# Patient Record
Sex: FEMALE | Race: WHITE | NOT HISPANIC OR LATINO | Employment: OTHER | URBAN - METROPOLITAN AREA
[De-identification: names, ages, dates, MRNs, and addresses within clinical notes are randomized per-mention and may not be internally consistent; named-entity substitution may affect disease eponyms.]

---

## 2017-07-07 ENCOUNTER — APPOINTMENT (OUTPATIENT)
Dept: CT IMAGING | Facility: HOSPITAL | Age: 57
End: 2017-07-07

## 2017-07-07 ENCOUNTER — APPOINTMENT (OUTPATIENT)
Dept: GENERAL RADIOLOGY | Facility: HOSPITAL | Age: 57
End: 2017-07-07

## 2017-07-07 ENCOUNTER — HOSPITAL ENCOUNTER (INPATIENT)
Facility: HOSPITAL | Age: 57
LOS: 3 days | Discharge: HOME | End: 2017-07-10
Attending: EMERGENCY MEDICINE | Admitting: FAMILY MEDICINE

## 2017-07-07 DIAGNOSIS — N28.9 RENAL LESION: ICD-10-CM

## 2017-07-07 DIAGNOSIS — R50.9 ACUTE FEBRILE ILLNESS: Primary | ICD-10-CM

## 2017-07-07 LAB
ALBUMIN SERPL-MCNC: 3.9 G/DL (ref 3.2–4.8)
ALBUMIN/GLOB SERPL: 1 G/DL (ref 1.5–2.5)
ALP SERPL-CCNC: 94 U/L (ref 25–100)
ALT SERPL W P-5'-P-CCNC: 25 U/L (ref 7–40)
ANION GAP SERPL CALCULATED.3IONS-SCNC: 10 MMOL/L (ref 3–11)
AST SERPL-CCNC: 20 U/L (ref 0–33)
BACTERIA UR QL AUTO: ABNORMAL /HPF
BASOPHILS # BLD AUTO: 0.02 10*3/MM3 (ref 0–0.2)
BASOPHILS NFR BLD AUTO: 0.2 % (ref 0–1)
BILIRUB SERPL-MCNC: 0.4 MG/DL (ref 0.3–1.2)
BILIRUB UR QL STRIP: NEGATIVE
BUN BLD-MCNC: 13 MG/DL (ref 9–23)
BUN/CREAT SERPL: 16.3 (ref 7–25)
CALCIUM SPEC-SCNC: 9.3 MG/DL (ref 8.7–10.4)
CHLORIDE SERPL-SCNC: 91 MMOL/L (ref 99–109)
CLARITY UR: CLEAR
CO2 SERPL-SCNC: 21 MMOL/L (ref 20–31)
COLOR UR: YELLOW
CREAT BLD-MCNC: 0.8 MG/DL (ref 0.6–1.3)
D-LACTATE SERPL-SCNC: 1.2 MMOL/L (ref 0.5–2)
DEPRECATED RDW RBC AUTO: 42.5 FL (ref 37–54)
EOSINOPHIL # BLD AUTO: 0.01 10*3/MM3 (ref 0–0.3)
EOSINOPHIL NFR BLD AUTO: 0.1 % (ref 0–3)
ERYTHROCYTE [DISTWIDTH] IN BLOOD BY AUTOMATED COUNT: 13.8 % (ref 11.3–14.5)
GFR SERPL CREATININE-BSD FRML MDRD: 74 ML/MIN/1.73
GLOBULIN UR ELPH-MCNC: 3.9 GM/DL
GLUCOSE BLD-MCNC: 81 MG/DL (ref 70–100)
GLUCOSE UR STRIP-MCNC: NEGATIVE MG/DL
HCT VFR BLD AUTO: 32 % (ref 34.5–44)
HGB BLD-MCNC: 11 G/DL (ref 11.5–15.5)
HGB UR QL STRIP.AUTO: ABNORMAL
HOLD SPECIMEN: NORMAL
HOLD SPECIMEN: NORMAL
HYALINE CASTS UR QL AUTO: ABNORMAL /LPF
IMM GRANULOCYTES # BLD: 0.06 10*3/MM3 (ref 0–0.03)
IMM GRANULOCYTES NFR BLD: 0.5 % (ref 0–0.6)
KETONES UR QL STRIP: ABNORMAL
LEUKOCYTE ESTERASE UR QL STRIP.AUTO: ABNORMAL
LIPASE SERPL-CCNC: 26 U/L (ref 6–51)
LYMPHOCYTES # BLD AUTO: 1.25 10*3/MM3 (ref 0.6–4.8)
LYMPHOCYTES NFR BLD AUTO: 9.5 % (ref 24–44)
MCH RBC QN AUTO: 29.3 PG (ref 27–31)
MCHC RBC AUTO-ENTMCNC: 34.4 G/DL (ref 32–36)
MCV RBC AUTO: 85.1 FL (ref 80–99)
MONOCYTES # BLD AUTO: 1.5 10*3/MM3 (ref 0–1)
MONOCYTES NFR BLD AUTO: 11.3 % (ref 0–12)
NEUTROPHILS # BLD AUTO: 10.38 10*3/MM3 (ref 1.5–8.3)
NEUTROPHILS NFR BLD AUTO: 78.4 % (ref 41–71)
NITRITE UR QL STRIP: NEGATIVE
PH UR STRIP.AUTO: 6.5 [PH] (ref 5–8)
PLATELET # BLD AUTO: 286 10*3/MM3 (ref 150–450)
PMV BLD AUTO: 8.9 FL (ref 6–12)
POTASSIUM BLD-SCNC: 3.7 MMOL/L (ref 3.5–5.5)
PROCALCITONIN SERPL-MCNC: 74.37 NG/ML
PROT SERPL-MCNC: 7.8 G/DL (ref 5.7–8.2)
PROT UR QL STRIP: ABNORMAL
RBC # BLD AUTO: 3.76 10*6/MM3 (ref 3.89–5.14)
RBC # UR: ABNORMAL /HPF
REF LAB TEST METHOD: ABNORMAL
SODIUM BLD-SCNC: 122 MMOL/L (ref 132–146)
SP GR UR STRIP: <=1.005 (ref 1–1.03)
SQUAMOUS #/AREA URNS HPF: ABNORMAL /HPF
UROBILINOGEN UR QL STRIP: ABNORMAL
WBC NRBC COR # BLD: 13.22 10*3/MM3 (ref 3.5–10.8)
WBC UR QL AUTO: ABNORMAL /HPF
WHOLE BLOOD HOLD SPECIMEN: NORMAL
WHOLE BLOOD HOLD SPECIMEN: NORMAL

## 2017-07-07 PROCEDURE — 0 IOPAMIDOL 61 % SOLUTION: Performed by: EMERGENCY MEDICINE

## 2017-07-07 PROCEDURE — 85025 COMPLETE CBC W/AUTO DIFF WBC: CPT | Performed by: EMERGENCY MEDICINE

## 2017-07-07 PROCEDURE — 87077 CULTURE AEROBIC IDENTIFY: CPT | Performed by: EMERGENCY MEDICINE

## 2017-07-07 PROCEDURE — 99285 EMERGENCY DEPT VISIT HI MDM: CPT

## 2017-07-07 PROCEDURE — 87040 BLOOD CULTURE FOR BACTERIA: CPT | Performed by: EMERGENCY MEDICINE

## 2017-07-07 PROCEDURE — 87186 SC STD MICRODIL/AGAR DIL: CPT | Performed by: EMERGENCY MEDICINE

## 2017-07-07 PROCEDURE — 87086 URINE CULTURE/COLONY COUNT: CPT | Performed by: EMERGENCY MEDICINE

## 2017-07-07 PROCEDURE — 25010000002 PIPERACILLIN-TAZOBACTAM: Performed by: EMERGENCY MEDICINE

## 2017-07-07 PROCEDURE — 83605 ASSAY OF LACTIC ACID: CPT | Performed by: EMERGENCY MEDICINE

## 2017-07-07 PROCEDURE — 25010000002 ONDANSETRON PER 1 MG: Performed by: EMERGENCY MEDICINE

## 2017-07-07 PROCEDURE — 83690 ASSAY OF LIPASE: CPT | Performed by: EMERGENCY MEDICINE

## 2017-07-07 PROCEDURE — 71010 HC CHEST PA OR AP: CPT

## 2017-07-07 PROCEDURE — 81001 URINALYSIS AUTO W/SCOPE: CPT | Performed by: EMERGENCY MEDICINE

## 2017-07-07 PROCEDURE — 84145 PROCALCITONIN (PCT): CPT | Performed by: EMERGENCY MEDICINE

## 2017-07-07 PROCEDURE — 25010000002 VANCOMYCIN PER 500 MG: Performed by: EMERGENCY MEDICINE

## 2017-07-07 PROCEDURE — 80053 COMPREHEN METABOLIC PANEL: CPT | Performed by: EMERGENCY MEDICINE

## 2017-07-07 PROCEDURE — 74177 CT ABD & PELVIS W/CONTRAST: CPT

## 2017-07-07 PROCEDURE — 99222 1ST HOSP IP/OBS MODERATE 55: CPT | Performed by: FAMILY MEDICINE

## 2017-07-07 RX ORDER — FLUOXETINE HYDROCHLORIDE 20 MG/1
20 CAPSULE ORAL DAILY
COMMUNITY

## 2017-07-07 RX ORDER — ACETAMINOPHEN 500 MG
1000 TABLET ORAL ONCE
Status: COMPLETED | OUTPATIENT
Start: 2017-07-07 | End: 2017-07-07

## 2017-07-07 RX ORDER — DICYCLOMINE HYDROCHLORIDE 10 MG/1
10 CAPSULE ORAL
Status: DISCONTINUED | OUTPATIENT
Start: 2017-07-08 | End: 2017-07-10 | Stop reason: HOSPADM

## 2017-07-07 RX ORDER — FLUOXETINE HYDROCHLORIDE 20 MG/1
20 CAPSULE ORAL DAILY
Status: DISCONTINUED | OUTPATIENT
Start: 2017-07-08 | End: 2017-07-10 | Stop reason: HOSPADM

## 2017-07-07 RX ORDER — MELOXICAM 15 MG/1
15 TABLET ORAL DAILY
COMMUNITY

## 2017-07-07 RX ORDER — DICYCLOMINE HYDROCHLORIDE 10 MG/1
10 CAPSULE ORAL
COMMUNITY

## 2017-07-07 RX ORDER — MELOXICAM 7.5 MG/1
15 TABLET ORAL DAILY
Status: DISCONTINUED | OUTPATIENT
Start: 2017-07-08 | End: 2017-07-10 | Stop reason: HOSPADM

## 2017-07-07 RX ORDER — ALBUTEROL SULFATE 90 UG/1
2 AEROSOL, METERED RESPIRATORY (INHALATION) EVERY 4 HOURS PRN
COMMUNITY

## 2017-07-07 RX ORDER — METOPROLOL TARTRATE 50 MG/1
50 TABLET, FILM COATED ORAL 2 TIMES DAILY
COMMUNITY

## 2017-07-07 RX ORDER — BUDESONIDE AND FORMOTEROL FUMARATE DIHYDRATE 80; 4.5 UG/1; UG/1
2 AEROSOL RESPIRATORY (INHALATION)
COMMUNITY

## 2017-07-07 RX ORDER — SODIUM CHLORIDE 450 MG/100ML
75 INJECTION, SOLUTION INTRAVENOUS CONTINUOUS
Status: DISCONTINUED | OUTPATIENT
Start: 2017-07-07 | End: 2017-07-08

## 2017-07-07 RX ORDER — SODIUM CHLORIDE 0.9 % (FLUSH) 0.9 %
10 SYRINGE (ML) INJECTION AS NEEDED
Status: DISCONTINUED | OUTPATIENT
Start: 2017-07-07 | End: 2017-07-10 | Stop reason: HOSPADM

## 2017-07-07 RX ORDER — METOPROLOL TARTRATE 50 MG/1
50 TABLET, FILM COATED ORAL EVERY 12 HOURS SCHEDULED
Status: DISCONTINUED | OUTPATIENT
Start: 2017-07-08 | End: 2017-07-10 | Stop reason: HOSPADM

## 2017-07-07 RX ORDER — FOLIC ACID 1 MG/1
1 TABLET ORAL DAILY
COMMUNITY

## 2017-07-07 RX ORDER — BUDESONIDE AND FORMOTEROL FUMARATE DIHYDRATE 80; 4.5 UG/1; UG/1
2 AEROSOL RESPIRATORY (INHALATION)
Status: DISCONTINUED | OUTPATIENT
Start: 2017-07-08 | End: 2017-07-10 | Stop reason: HOSPADM

## 2017-07-07 RX ORDER — FOLIC ACID 1 MG/1
1 TABLET ORAL DAILY
Status: DISCONTINUED | OUTPATIENT
Start: 2017-07-08 | End: 2017-07-10 | Stop reason: HOSPADM

## 2017-07-07 RX ORDER — ONDANSETRON 2 MG/ML
4 INJECTION INTRAMUSCULAR; INTRAVENOUS ONCE
Status: COMPLETED | OUTPATIENT
Start: 2017-07-07 | End: 2017-07-07

## 2017-07-07 RX ORDER — PRAVASTATIN SODIUM 20 MG
20 TABLET ORAL DAILY
COMMUNITY

## 2017-07-07 RX ORDER — VANCOMYCIN HYDROCHLORIDE 1 G/200ML
20 INJECTION, SOLUTION INTRAVENOUS ONCE
Status: COMPLETED | OUTPATIENT
Start: 2017-07-07 | End: 2017-07-07

## 2017-07-07 RX ADMIN — SODIUM CHLORIDE 75 ML/HR: 4.5 INJECTION, SOLUTION INTRAVENOUS at 23:51

## 2017-07-07 RX ADMIN — SODIUM CHLORIDE 1000 ML: 9 INJECTION, SOLUTION INTRAVENOUS at 19:24

## 2017-07-07 RX ADMIN — VANCOMYCIN HYDROCHLORIDE 1000 MG: 1 INJECTION, SOLUTION INTRAVENOUS at 22:39

## 2017-07-07 RX ADMIN — IOPAMIDOL 80 ML: 612 INJECTION, SOLUTION INTRAVENOUS at 20:41

## 2017-07-07 RX ADMIN — ONDANSETRON 4 MG: 2 INJECTION INTRAMUSCULAR; INTRAVENOUS at 19:27

## 2017-07-07 RX ADMIN — TAZOBACTAM SODIUM AND PIPERACILLIN SODIUM 4.5 G: .5; 4 INJECTION, POWDER, LYOPHILIZED, FOR SOLUTION INTRAVENOUS at 22:03

## 2017-07-07 RX ADMIN — ACETAMINOPHEN 1000 MG: 500 TABLET ORAL at 19:27

## 2017-07-07 NOTE — ED PROVIDER NOTES
"Subjective   HPI Comments: Venessa Bejarano is a 57 y.o.female who presents to the ED with c/o abdominal pain and diarrhea. The patient reports she has been experiencing nausea, vomiting, diarrhea, abdominal pain, fever and chills with onset 4 days ago. She believed she just had a stomach bug, however her symptoms have persisted, prompting presentation into the ED for evaluation. She only experiences abdominal cramping with bowel movements, otherwise pain is not present. She had about 5 episodes of diarrhea today, which she describes as \"complete blow outs\". She denies recent antibiotics, recent sick contact, cough, congestion, or any other acute complaints at this time.     Patient is a 57 y.o. female presenting with abdominal pain.   History provided by:  Patient  Abdominal Pain   Pain location:  Generalized  Pain quality: cramping    Pain radiates to:  Does not radiate  Pain severity:  Moderate  Onset quality:  Sudden  Duration:  4 days  Timing: with bowel movements.  Progression:  Unchanged  Chronicity:  New  Context: not sick contacts    Context comment:  Sudden onset of N/V/D, fever, chills onset 4 days  Relieved by:  Nothing  Worsened by:  Nothing  Ineffective treatments:  None tried  Associated symptoms: chills, diarrhea, fever, nausea and vomiting    Associated symptoms: no chest pain, no cough and no shortness of breath        Review of Systems   Constitutional: Positive for chills and fever.   HENT: Negative for congestion.    Respiratory: Negative for cough and shortness of breath.    Cardiovascular: Negative for chest pain.   Gastrointestinal: Positive for abdominal pain, diarrhea, nausea and vomiting.   All other systems reviewed and are negative.      Past Medical History:   Diagnosis Date   • COPD (chronic obstructive pulmonary disease)    • Depression    • Hyperlipidemia    • Hypertension    • Lupus        No Known Allergies    History reviewed. No pertinent surgical history.    History reviewed. No " pertinent family history.    Social History     Social History   • Marital status: Single     Spouse name: N/A   • Number of children: N/A   • Years of education: N/A     Social History Main Topics   • Smoking status: Current Every Day Smoker     Packs/day: 1.00     Types: Cigarettes   • Smokeless tobacco: None   • Alcohol use No      Comment: FORMER ALCOHOLIC    • Drug use: No   • Sexual activity: Defer     Other Topics Concern   • None     Social History Narrative   • None         Objective   Physical Exam   Constitutional: She is oriented to person, place, and time. She appears well-developed and well-nourished. No distress.   HENT:   Head: Normocephalic and atraumatic.   Dr mucous membranes.   Eyes: Conjunctivae are normal. No scleral icterus.   Neck: Normal range of motion. Neck supple.   Cardiovascular: Normal rate, regular rhythm and normal heart sounds.    Pulmonary/Chest: Effort normal and breath sounds normal. No respiratory distress.   Abdominal: Soft. There is tenderness.   Mild diffuse abdominal tenderness. Hypoactive bowel sounds.   Musculoskeletal: Normal range of motion. She exhibits no edema.   Neurological: She is alert and oriented to person, place, and time.   Skin: Skin is warm and dry.   Psychiatric: She has a normal mood and affect. Her behavior is normal.   Nursing note and vitals reviewed.      Procedures         ED Course  ED Course     Recent Results (from the past 24 hour(s))   Comprehensive Metabolic Panel    Collection Time: 07/07/17  5:28 PM   Result Value Ref Range    Glucose 81 70 - 100 mg/dL    BUN 13 9 - 23 mg/dL    Creatinine 0.80 0.60 - 1.30 mg/dL    Sodium 122 (L) 132 - 146 mmol/L    Potassium 3.7 3.5 - 5.5 mmol/L    Chloride 91 (L) 99 - 109 mmol/L    CO2 21.0 20.0 - 31.0 mmol/L    Calcium 9.3 8.7 - 10.4 mg/dL    Total Protein 7.8 5.7 - 8.2 g/dL    Albumin 3.90 3.20 - 4.80 g/dL    ALT (SGPT) 25 7 - 40 U/L    AST (SGOT) 20 0 - 33 U/L    Alkaline Phosphatase 94 25 - 100 U/L     Total Bilirubin 0.4 0.3 - 1.2 mg/dL    eGFR Non African Amer 74 >60 mL/min/1.73    Globulin 3.9 gm/dL    A/G Ratio 1.0 (L) 1.5 - 2.5 g/dL    BUN/Creatinine Ratio 16.3 7.0 - 25.0    Anion Gap 10.0 3.0 - 11.0 mmol/L   Lipase    Collection Time: 07/07/17  5:28 PM   Result Value Ref Range    Lipase 26 6 - 51 U/L   Light Blue Top    Collection Time: 07/07/17  5:28 PM   Result Value Ref Range    Extra Tube hold for add-on    Green Top (Gel)    Collection Time: 07/07/17  5:28 PM   Result Value Ref Range    Extra Tube Hold for add-ons.    Lavender Top    Collection Time: 07/07/17  5:28 PM   Result Value Ref Range    Extra Tube hold for add-on    Gold Top - SST    Collection Time: 07/07/17  5:28 PM   Result Value Ref Range    Extra Tube Hold for add-ons.    CBC Auto Differential    Collection Time: 07/07/17  5:28 PM   Result Value Ref Range    WBC 13.22 (H) 3.50 - 10.80 10*3/mm3    RBC 3.76 (L) 3.89 - 5.14 10*6/mm3    Hemoglobin 11.0 (L) 11.5 - 15.5 g/dL    Hematocrit 32.0 (L) 34.5 - 44.0 %    MCV 85.1 80.0 - 99.0 fL    MCH 29.3 27.0 - 31.0 pg    MCHC 34.4 32.0 - 36.0 g/dL    RDW 13.8 11.3 - 14.5 %    RDW-SD 42.5 37.0 - 54.0 fl    MPV 8.9 6.0 - 12.0 fL    Platelets 286 150 - 450 10*3/mm3    Neutrophil % 78.4 (H) 41.0 - 71.0 %    Lymphocyte % 9.5 (L) 24.0 - 44.0 %    Monocyte % 11.3 0.0 - 12.0 %    Eosinophil % 0.1 0.0 - 3.0 %    Basophil % 0.2 0.0 - 1.0 %    Immature Grans % 0.5 0.0 - 0.6 %    Neutrophils, Absolute 10.38 (H) 1.50 - 8.30 10*3/mm3    Lymphocytes, Absolute 1.25 0.60 - 4.80 10*3/mm3    Monocytes, Absolute 1.50 (H) 0.00 - 1.00 10*3/mm3    Eosinophils, Absolute 0.01 0.00 - 0.30 10*3/mm3    Basophils, Absolute 0.02 0.00 - 0.20 10*3/mm3    Immature Grans, Absolute 0.06 (H) 0.00 - 0.03 10*3/mm3   Urinalysis With / Culture If Indicated    Collection Time: 07/07/17  5:30 PM   Result Value Ref Range    Color, UA Yellow Yellow, Straw    Appearance, UA Clear Clear    pH, UA 6.5 5.0 - 8.0    Specific Gravity, UA <=1.005  1.001 - 1.030    Glucose, UA Negative Negative    Ketones, UA 15 mg/dL (1+) (A) Negative    Bilirubin, UA Negative Negative    Blood, UA Small (1+) (A) Negative    Protein, UA Trace (A) Negative    Leuk Esterase, UA Large (3+) (A) Negative    Nitrite, UA Negative Negative    Urobilinogen, UA 0.2 E.U./dL 0.2 - 1.0 E.U./dL   Urinalysis, Microscopic Only    Collection Time: 07/07/17  5:30 PM   Result Value Ref Range    RBC, UA 0-2 None Seen, 0-2 /HPF    WBC, UA 6-12 (A) None Seen /HPF    Bacteria, UA 1+ (A) None Seen, Trace /HPF    Squamous Epithelial Cells, UA 7-12 (A) None Seen, 0-2 /HPF    Hyaline Casts, UA 0-6 0 - 6 /LPF    Methodology Manual Light Microscopy    Lactic Acid, Plasma    Collection Time: 07/07/17  7:07 PM   Result Value Ref Range    Lactate 1.2 0.5 - 2.0 mmol/L   Procalcitonin    Collection Time: 07/07/17  7:34 PM   Result Value Ref Range    Procalcitonin 74.37 ng/mL     Note: In addition to lab results from this visit, the labs listed above may include labs taken at another facility or during a different encounter within the last 24 hours. Please correlate lab times with ED admission and discharge times for further clarification of the services performed during this visit.    XR Chest 1 View   Final Result   Abnormal     No acute findings visualized in the chest.       THIS DOCUMENT HAS BEEN ELECTRONICALLY SIGNED BY JOHNNY QUIROZ MD      CT Abdomen Pelvis With Contrast   Final Result   Abnormal   1.  Mild heterogeneous enhancement of the kidneys with associated perinephric    stranding which is more prominent on the left.  Findings are suspicious for    pyelonephritis.  Recommend clinical and laboratory correlation.   2.  Small left renal lesions, some of which are not compatible with simple    cysts.  One of these demonstrates coarse calcifications, compatible with a    Bosniak IIF cyst.    ACR White Paper guidelines (Gallo, et al. JACR 2010;    7(32):629-45) suggest CT or MRI follow-up in 6 months.    3.  L1 compression fracture with severe vertebral body height loss and    associated retropulsion.  This is favored to be chronic.        THIS DOCUMENT HAS BEEN ELECTRONICALLY SIGNED BY JOHNNY QUIROZ MD        Vitals:    07/07/17 2000 07/07/17 2100 07/07/17 2113 07/07/17 2200   BP: 143/85 136/76  108/51   BP Location:       Patient Position:       Pulse:       Resp:       Temp:   99.5 °F (37.5 °C)    TempSrc:   Oral    SpO2:    97%   Weight:       Height:         Medications   sodium chloride 0.9 % flush 10 mL (not administered)   vancomycin (VANCOCIN) in iso-osmotic dextrose IVPB 1 g (premix) 200 mL (1,000 mg Intravenous New Bag 7/7/17 2239)   sodium chloride 0.9 % bolus 1,000 mL (0 mL Intravenous Stopped 7/7/17 2056)   ondansetron (ZOFRAN) injection 4 mg (4 mg Intravenous Given 7/7/17 1927)   acetaminophen (TYLENOL) tablet 1,000 mg (1,000 mg Oral Given 7/7/17 1927)   iopamidol (ISOVUE-300) 61 % injection 100 mL (80 mL Intravenous Given 7/7/17 2041)   piperacillin-tazobactam (ZOSYN) 4.5 g/100 mL 0.9% NS IVPB (mbp) (0 g Intravenous Stopped 7/7/17 2236)     ECG/EMG Results (last 24 hours)     ** No results found for the last 24 hours. **                  MDM    Final diagnoses:   Acute febrile illness   Renal lesion       Documentation assistance provided by mercy Covarrubias.  Information recorded by the scribbernarda was done at my direction and has been verified and validated by me.     Olinda Covarrubias  07/07/17 1909       Olinda Covarrubias  07/07/17 2023       Elton Burgess MD  07/07/17 2306

## 2017-07-08 LAB
ALBUMIN SERPL-MCNC: 2.9 G/DL (ref 3.2–4.8)
ALBUMIN/GLOB SERPL: 1 G/DL (ref 1.5–2.5)
ALP SERPL-CCNC: 72 U/L (ref 25–100)
ALT SERPL W P-5'-P-CCNC: 13 U/L (ref 7–40)
ANION GAP SERPL CALCULATED.3IONS-SCNC: 5 MMOL/L (ref 3–11)
AST SERPL-CCNC: 9 U/L (ref 0–33)
BILIRUB SERPL-MCNC: 0.3 MG/DL (ref 0.3–1.2)
BUN BLD-MCNC: 12 MG/DL (ref 9–23)
BUN/CREAT SERPL: 15 (ref 7–25)
CALCIUM SPEC-SCNC: 8 MG/DL (ref 8.7–10.4)
CHLORIDE SERPL-SCNC: 101 MMOL/L (ref 99–109)
CO2 SERPL-SCNC: 19 MMOL/L (ref 20–31)
CREAT BLD-MCNC: 0.8 MG/DL (ref 0.6–1.3)
DEPRECATED RDW RBC AUTO: 43.2 FL (ref 37–54)
ERYTHROCYTE [DISTWIDTH] IN BLOOD BY AUTOMATED COUNT: 13.8 % (ref 11.3–14.5)
GFR SERPL CREATININE-BSD FRML MDRD: 74 ML/MIN/1.73
GLOBULIN UR ELPH-MCNC: 2.8 GM/DL
GLUCOSE BLD-MCNC: 102 MG/DL (ref 70–100)
HCT VFR BLD AUTO: 26.7 % (ref 34.5–44)
HGB BLD-MCNC: 9.2 G/DL (ref 11.5–15.5)
MCH RBC QN AUTO: 29.1 PG (ref 27–31)
MCHC RBC AUTO-ENTMCNC: 34.5 G/DL (ref 32–36)
MCV RBC AUTO: 84.5 FL (ref 80–99)
PLATELET # BLD AUTO: 230 10*3/MM3 (ref 150–450)
PMV BLD AUTO: 8.7 FL (ref 6–12)
POTASSIUM BLD-SCNC: 3.3 MMOL/L (ref 3.5–5.5)
POTASSIUM BLD-SCNC: 3.8 MMOL/L (ref 3.5–5.5)
PROCALCITONIN SERPL-MCNC: 46.2 NG/ML
PROT SERPL-MCNC: 5.7 G/DL (ref 5.7–8.2)
RBC # BLD AUTO: 3.16 10*6/MM3 (ref 3.89–5.14)
SODIUM BLD-SCNC: 125 MMOL/L (ref 132–146)
WBC NRBC COR # BLD: 9.62 10*3/MM3 (ref 3.5–10.8)

## 2017-07-08 PROCEDURE — 25010000002 VANCOMYCIN PER 500 MG

## 2017-07-08 PROCEDURE — 94799 UNLISTED PULMONARY SVC/PX: CPT

## 2017-07-08 PROCEDURE — 99232 SBSQ HOSP IP/OBS MODERATE 35: CPT | Performed by: HOSPITALIST

## 2017-07-08 PROCEDURE — 87045 FECES CULTURE AEROBIC BACT: CPT | Performed by: INTERNAL MEDICINE

## 2017-07-08 PROCEDURE — 94640 AIRWAY INHALATION TREATMENT: CPT

## 2017-07-08 PROCEDURE — 25010000002 PIPERACILLIN-TAZOBACTAM: Performed by: FAMILY MEDICINE

## 2017-07-08 PROCEDURE — 84145 PROCALCITONIN (PCT): CPT | Performed by: INTERNAL MEDICINE

## 2017-07-08 PROCEDURE — 87507 IADNA-DNA/RNA PROBE TQ 12-25: CPT | Performed by: INTERNAL MEDICINE

## 2017-07-08 PROCEDURE — 25010000002 ENOXAPARIN PER 10 MG

## 2017-07-08 PROCEDURE — 87046 STOOL CULTR AEROBIC BACT EA: CPT | Performed by: INTERNAL MEDICINE

## 2017-07-08 PROCEDURE — 85027 COMPLETE CBC AUTOMATED: CPT | Performed by: FAMILY MEDICINE

## 2017-07-08 PROCEDURE — 80053 COMPREHEN METABOLIC PANEL: CPT | Performed by: FAMILY MEDICINE

## 2017-07-08 PROCEDURE — 84132 ASSAY OF SERUM POTASSIUM: CPT | Performed by: NURSE PRACTITIONER

## 2017-07-08 RX ORDER — POTASSIUM CHLORIDE 750 MG/1
20 CAPSULE, EXTENDED RELEASE ORAL ONCE
Status: COMPLETED | OUTPATIENT
Start: 2017-07-08 | End: 2017-07-08

## 2017-07-08 RX ORDER — SODIUM CHLORIDE 9 MG/ML
125 INJECTION, SOLUTION INTRAVENOUS CONTINUOUS
Status: DISCONTINUED | OUTPATIENT
Start: 2017-07-08 | End: 2017-07-10 | Stop reason: HOSPADM

## 2017-07-08 RX ORDER — ACETAMINOPHEN 325 MG/1
650 TABLET ORAL EVERY 6 HOURS PRN
Status: DISCONTINUED | OUTPATIENT
Start: 2017-07-08 | End: 2017-07-10 | Stop reason: HOSPADM

## 2017-07-08 RX ORDER — SODIUM CHLORIDE 9 MG/ML
100 INJECTION, SOLUTION INTRAVENOUS CONTINUOUS
Status: DISCONTINUED | OUTPATIENT
Start: 2017-07-08 | End: 2017-07-08

## 2017-07-08 RX ADMIN — FOLIC ACID 1 MG: 1 TABLET ORAL at 08:47

## 2017-07-08 RX ADMIN — PIPERACILLIN SODIUM,TAZOBACTAM SODIUM 3.38 G: 3; .375 INJECTION, POWDER, FOR SOLUTION INTRAVENOUS at 21:53

## 2017-07-08 RX ADMIN — POTASSIUM CHLORIDE 20 MEQ: 750 CAPSULE, EXTENDED RELEASE ORAL at 05:57

## 2017-07-08 RX ADMIN — FLUOXETINE HYDROCHLORIDE 20 MG: 20 CAPSULE ORAL at 08:47

## 2017-07-08 RX ADMIN — DICYCLOMINE HYDROCHLORIDE 10 MG: 10 CAPSULE ORAL at 20:41

## 2017-07-08 RX ADMIN — SODIUM CHLORIDE 1000 ML: 9 INJECTION, SOLUTION INTRAVENOUS at 02:58

## 2017-07-08 RX ADMIN — DICYCLOMINE HYDROCHLORIDE 10 MG: 10 CAPSULE ORAL at 12:49

## 2017-07-08 RX ADMIN — PIPERACILLIN SODIUM,TAZOBACTAM SODIUM 3.38 G: 3; .375 INJECTION, POWDER, FOR SOLUTION INTRAVENOUS at 05:03

## 2017-07-08 RX ADMIN — SODIUM CHLORIDE 100 ML/HR: 9 INJECTION, SOLUTION INTRAVENOUS at 04:01

## 2017-07-08 RX ADMIN — ACETAMINOPHEN 650 MG: 325 TABLET, FILM COATED ORAL at 12:51

## 2017-07-08 RX ADMIN — ENOXAPARIN SODIUM 40 MG: 40 INJECTION SUBCUTANEOUS at 08:49

## 2017-07-08 RX ADMIN — SODIUM CHLORIDE 125 ML/HR: 9 INJECTION, SOLUTION INTRAVENOUS at 18:18

## 2017-07-08 RX ADMIN — METOPROLOL TARTRATE 50 MG: 50 TABLET, FILM COATED ORAL at 20:40

## 2017-07-08 RX ADMIN — ACETAMINOPHEN 650 MG: 325 TABLET, FILM COATED ORAL at 03:04

## 2017-07-08 RX ADMIN — VANCOMYCIN HYDROCHLORIDE 750 MG: 750 INJECTION, SOLUTION INTRAVENOUS at 20:40

## 2017-07-08 RX ADMIN — MELOXICAM 15 MG: 7.5 TABLET ORAL at 08:47

## 2017-07-08 RX ADMIN — BUDESONIDE AND FORMOTEROL FUMARATE DIHYDRATE 2 PUFF: 80; 4.5 AEROSOL RESPIRATORY (INHALATION) at 21:27

## 2017-07-08 RX ADMIN — BUDESONIDE AND FORMOTEROL FUMARATE DIHYDRATE 2 PUFF: 80; 4.5 AEROSOL RESPIRATORY (INHALATION) at 09:28

## 2017-07-08 RX ADMIN — METOPROLOL TARTRATE 50 MG: 50 TABLET, FILM COATED ORAL at 08:47

## 2017-07-08 RX ADMIN — DICYCLOMINE HYDROCHLORIDE 10 MG: 10 CAPSULE ORAL at 08:47

## 2017-07-08 NOTE — CONSULTS
INFECTIOUS DISEASE CONSULT/INITIAL HOSPITAL VISIT    Venessa Bejarano  1960  [id number]    Date of consult: 7/8/2017    Admit date: 7/7/2017    Requesting Provider: No ref. provider found  Evaluating physician: Pk Kong MD  Reason for Consultation: Sepsis  Chief Complaint: Sepsis, cystitis      Subjective   History of present illness:  Patient is a very pleasant  57 y.o.  Yr old female with previous history of COPD, previous CVA 2014, lupus, who was admitted to [facility] on 7/7/17 with diarrhea, fever to 102 Fahrenheit.  Patient developed nausea, chills, vomiting approximately 7/3/17.  Patient's diarrhea increased over the past 24 hours to 5-10 episodes with vomiting.  She does not recall ill contacts, zoonotic exposures, significant travel history, or immunocompromised state.  Her white blood cell count had 13.2, 78% neutrophils, hemoglobin 11.0, platelets 286, creatinine 0.8, sodium 122, urinalysis with 6-12 WBCs, large leukocyte esterase, and a urine culture from 7/7 that is greater than 100,000 colony-forming units per mL gram-negative bacilli.  Liver function tests unremarkable.  Lipase 26.  Her chest x-ray was negative.  A CT scan of the abdomen and pelvis from 7/7 revealed perinephric stranding of the kidneys more prominent on the left, and small left renal lesions not compatible with simple cyst (she has been followed for renal lesions since 2014 in [place]).  The patient was started empirically on vancomycin and Zosyn.  I was consulted on 7/8/17 for further evaluation and treatment.  The patient denies flank pain, but did have some lower back pain past 3-4 days.    Past Medical History:   Diagnosis Date   • COPD (chronic obstructive pulmonary disease)    • Depression    • Hyperlipidemia    • Hypertension    • Lupus        History reviewed. No pertinent surgical history.    Pediatric History   Patient Guardian Status   • Not on file.     Other Topics Concern   • Not  on file     Social History Narrative   • No narrative on file   , 3 children, on disability after CVA, lives in [place] but is visiting her son and her first grandbaby.    family history is not on file.  Unremarkable  No Known Allergies    There is no immunization history for the selected administration types on file for this patient.    Medication:    Current Facility-Administered Medications:   •  [START ON 7/9/2017] ! Vancomycin Trough before 21:00 dose on Sunday 7/9/17; Hold dose if level > 20, , Does not apply, Once, Olvin Kohli, [facility]  •  acetaminophen (TYLENOL) tablet 650 mg, 650 mg, Oral, Q6H PRN, Tim Osorio MD, 650 mg at 07/08/17 0304  •  albuterol (PROVENTIL) nebulizer solution 0.5% 2.5 mg/0.5mL, 2.5 mg, Nebulization, Q4H PRN, Johan Lu DO  •  budesonide-formoterol (SYMBICORT) 80-4.5 MCG/ACT inhaler 2 puff, 2 puff, Inhalation, BID - RT, Johan Lu DO, 2 puff at 07/08/17 0928  •  dicyclomine (BENTYL) capsule 10 mg, 10 mg, Oral, 4x Daily AC & at Bedtime, Johan Lu DO, 10 mg at 07/08/17 0847  •  enoxaparin (LOVENOX) syringe 40 mg, 40 mg, Subcutaneous, Q24H, Olvin Kohli, [facility], 40 mg at 07/08/17 0849  •  FLUoxetine (PROzac) capsule 20 mg, 20 mg, Oral, Daily, Johan Lu DO, 20 mg at 07/08/17 0847  •  folic acid (FOLVITE) tablet 1 mg, 1 mg, Oral, Daily, Johan Lu DO, 1 mg at 07/08/17 0847  •  meloxicam (MOBIC) tablet 15 mg, 15 mg, Oral, Daily, Johan Lu DO, 15 mg at 07/08/17 0847  •  metoprolol tartrate (LOPRESSOR) tablet 50 mg, 50 mg, Oral, Q12H, Johan Lu DO, 50 mg at 07/08/17 0847  •  Pharmacy to Dose enoxaparin (LOVENOX), , Does not apply, Continuous PRN, Johan Lu, DO  •  Pharmacy to dose vancomycin, , Does not apply, Continuous PRN, Johan Lu DO  •  piperacillin-tazobactam (ZOSYN) 3.375 g/100 mL 0.9% NS IVPB (mbp), 3.375 g, Intravenous, Q8H, Johan Lu DO, 3.375 g at 07/08/17 0503  •  pneumococcal polysaccharide 23-valent  "(PNEUMOVAX-23) vaccine 0.5 mL, 0.5 mL, Intramuscular, During Hospitalization, Johan Lu, DO  •  sodium chloride 0.9 % flush 10 mL, 10 mL, Intravenous, PRN, Elton Burgess MD  •  sodium chloride 0.9 % infusion, 100 mL/hr, Intravenous, Continuous, Tim Osorio MD, Last Rate: 100 mL/hr at 07/08/17 0401, 100 mL/hr at 07/08/17 0401  •  vancomycin in dextrose 5% 150 mL (VANCOCIN) IVPB 750 mg, 15 mg/kg, Intravenous, Q24H, Olvin Kohli [facility]    Please refer to the medical record for a full medication list    Review of Systems:    Constitutional-- Fever, chills, but no sweats.  Appetite poor, and no malaise. No fatigue.  HEENT-- No new vision, hearing or throat complaints.  No epistaxis or oral sores.  Denies odynophagia or dysphagia.  No odynophagia or dysphagia. No headache, photophobia or neck stiffness.  CV-- No chest pain, palpitation or syncope  Resp-- No SOB/cough/Hemoptysis  GI- some nausea, vomiting, and diarrhea.  No hematochezia, melena, or hematemesis. Denies jaundice or chronic liver disease.  -- No dysuria, hematuria, or flank pain.  Denies hesitancy, urgency.  Lymph- no swollen lymph nodes in neck/axilla or groin.   Heme- No active bruising or bleeding; no Hx of DVT or PE.  MS-- no swelling or pain in the bones or joints of arms/legs.  No new back pain.  Neuro-- No acute focal weakness or numbness in the arms or legs.  No seizures.  Skin--No rashes or lesions    Physical Exam:   Vital Signs   Temp:  [98 °F (36.7 °C)-102.4 °F (39.1 °C)] 99.9 °F (37.7 °C)  Heart Rate:  [] 85  Resp:  [16-22] 18  BP: (108-143)/(51-91) 138/91    Blood pressure 138/91, pulse 85, temperature 99.9 °F (37.7 °C), temperature source Oral, resp. rate 18, height 62\" (157.5 cm), weight 121 lb 9.6 oz (55.2 kg), SpO2 92 %.  GENERAL: Awake and alert, in moderate distress. Appears older than stated age.  HEENT:  Normocephalic, atraumatic.  Oropharynx without thrush. Dentition in good repair. No cervical adenopathy. No neck " masses.  Ears externally normal, Nose externally normal.  EYES: PERRL. No conjunctival injection. No icterus. EOM full.  LYMPHATICS: No lymphadenopathy of the neck or axillary or inguinal regions.   HEART: No murmur, gallop, or pericardial friction rub. Reg rate rhythm, No JVD at 45 degrees.  LUNGS: Clear to auscultation and percussion. No respiratory distress, no use of accessory muscles.  ABDOMEN: Soft, nontender, nondistended. No appreciable HSM.  Bowel sounds normal.  GENITAL: No external lesions, breasts without masses, back straight, no CVAT, rectal external without lesions.   SKIN: Warm and dry without cutaneous eruptions.  No nodules.    PSYCHIATRIC: Mental status lucid. No confusion.  EXT:  No cellulitic change.  NEURO: Oriented to name, CN 2 to 12 intact, DTR 1 + and symmetric, sensory intact to LT upper and lower extremitiy, motor 5/5 upper and lower extremity, cerebellar and gait not tested.      Results Review:   I reviewed the patient's new clinical results.  I reviewed the patient's new imaging results and agree with the interpretation.  I reviewed the patient's other test results and agree with the interpretation.      Results from last 7 days  Lab Units 07/08/17  0435 07/07/17  1728   WBC 10*3/mm3 9.62 13.22*   HEMOGLOBIN g/dL 9.2* 11.0*   HEMATOCRIT % 26.7* 32.0*   PLATELETS 10*3/mm3 230 286       Results from last 7 days  Lab Units 07/08/17  0938 07/08/17  0435   SODIUM mmol/L  --  125*   POTASSIUM mmol/L 3.8 3.3*   CHLORIDE mmol/L  --  101   CO2 mmol/L  --  19.0*   BUN mg/dL  --  12   CREATININE mg/dL  --  0.80   GLUCOSE mg/dL  --  102*   CALCIUM mg/dL  --  8.0*       Results from last 7 days  Lab Units 07/08/17  0435   ALK PHOS U/L 72   BILIRUBIN mg/dL 0.3   ALT (SGPT) U/L 13   AST (SGOT) U/L 9                   Results from last 7 days  Lab Units 07/07/17  1907   LACTATE mmol/L 1.2     Estimated Creatinine Clearance: 69.2 mL/min (by C-G formula based on Cr of 0.8).    Microbiology:  Blood  culture ×2 from 7/7 negative, urine culture from 7/7 greater than 100,000 gram-negative rods.  C. difficile toxin pending.    Radiology:  Imaging Results (last 72 hours)     Procedure Component Value Units Date/Time    CT Abdomen Pelvis With Contrast [[id number]]  (Abnormal) Collected:  07/07/17 1941     Updated:  07/07/17 2134    Narrative:       EXAM:    CT Abdomen and Pelvis With Intravenous Contrast    CLINICAL HISTORY:    57 years, female; Pain; Abdominal pain; Other: See notes; Additional info:   Abd pain, fever    TECHNIQUE:    Axial computed tomography images of the abdomen and pelvis with intravenous   contrast.  This CT exam was performed using one or more of the following dose   reduction techniques:  automated exposure control, adjustment of the mA and/or   kV according to patient size, and/or use of iterative reconstruction technique.    Coronal reformatted images were created and reviewed.    CONTRAST:    80 mL of isovue 300 administered intravenously.    COMPARISON:    No relevant prior studies available.    FINDINGS:    Lower thorax: No acute findings.     ABDOMEN:    Liver:  Unremarkable.  No obvious mass.    Gallbladder and bile ducts:  Cholelithiasis is noted. No obvious gallbladder   wall thickening or pericholecytistic inflammatory changes. No significant   biliary ductal dilatation appreciated.    Pancreas:  Unremarkable.  No ductal dilation.  No obvious mass.    Spleen:  Unremarkable.  No splenomegaly.    Adrenals:  Unremarkable.  No adrenal nodules or masses identified.    Kidneys and ureters:  There is mild heterogeneous enhancement of the kidneys,   best demonstrated on the delayed phase.  There is also mild perinephric   stranding which is slightly more prominent on the left.  Findings are   suspicious for pyelonephritis.  No hydronephrosis.  No renal or ureteral   stones.  There are small hypodense lesions at the upper pole of the left   kidney, one of which is compatible with a simple  cyst.  The more lateral lesion   is slightly more dense than a simple cyst, and measures 1.5 cm.  There is an   additional lesion in the upper to midpole the left kidney laterally which   demonstrates coarse calcifications within it.  This measures 2.1 cm in   diameter.  Just inferior to this, within the mid to lower pole, there is an   additional hypodense lesion which likely represents a cyst.    Stomach and bowel:  No evidence of bowel obstruction.  No significant bowel   wall thickening appreciated.    Appendix: Normal appendix.     PELVIS:    Bladder:  Unremarkable.  No obvious mass.    Reproductive:  The uterus is atrophic.     ABDOMEN and PELVIS:    Intraperitoneal space:  Unremarkable.  No free air.  No significant fluid   collection.    Bones/joints:  Degenerative changes of the spine.  There is lumbarization of   L5.  There is a compression fracture of L1 with severe associated vertebral   body height loss and retropulsion.  However, this is favored to be chronic.    Soft tissues:  No significant abnormalities in the superficial soft tissues.    Vasculature:  Atherosclerotic calcifications are noted. No aortic aneurysm.    Lymph nodes:  No significant lymph node enlargement.      Impression:       1.  Mild heterogeneous enhancement of the kidneys with associated perinephric   stranding which is more prominent on the left.  Findings are suspicious for   pyelonephritis.  Recommend clinical and laboratory correlation.  2.  Small left renal lesions, some of which are not compatible with simple   cysts.  One of these demonstrates coarse calcifications, compatible with a   Bosniak IIF cyst.    ACR White Paper guidelines (Gallo, et al. JACR 2010;   7(10):754-59) suggest CT or MRI follow-up in 6 months.  3.  L1 compression fracture with severe vertebral body height loss and   associated retropulsion.  This is favored to be chronic.      THIS DOCUMENT HAS BEEN ELECTRONICALLY SIGNED BY JOHNNY QUIROZ MD    XR Chest 1  View [[id number]]  (Abnormal) Collected:  07/07/17 2143     Updated:  07/07/17 2233    Narrative:       EXAM:    XR Chest, 1 View    CLINICAL HISTORY:    57 years, female; Disorder of kidney and ureter, unspecified; Fever,   unspecified; Signs and symptoms    TECHNIQUE:    Frontal view of the chest.    COMPARISON:    CT ABDOMEN PELVIS W CONTRAST 7/7/2017 8:38:45 PM    FINDINGS:    Lungs: Nipple shadow visualized projecting over the right lower chest. The   lungs are otherwise clear.  No consolidation.    Pleural space:  Unremarkable.  No pneumothorax.    Heart:  Unremarkable.  No cardiomegaly.    Mediastinum:  Unremarkable.    Bones/joints:  No acute osseous findings.    Vasculature:  Atherosclerotic calcifications are visualized within the aorta.      Impression:         No acute findings visualized in the chest.     THIS DOCUMENT HAS BEEN ELECTRONICALLY SIGNED BY JOHNNY QUIROZ MD          IMPRESSION:     1.  Sepsis, present on admission, possibly related to left pyelonephritis versus gastroenteritis.  Urine is growing gram-negative rods.  Likely Enterobacteriaceae.  Less likely gastroenteritis with normal virus, salmonella, Shigella, Campylobacter, versus other.   2.  Left pyelonephritis gram-negative rods identification pending.  3.  Diarrhea, possible gastroenteritis versus C. difficile versus other.  4.  Leukocytosis, neutrophilic related to causes listed above.  5.  Anemia, chronic disease and related to acute infection.  6.  Hyponatremia 125.  7.  Hypokalemia 3.3.  8.  Hypocalcemia 8.0.     RECOMMENDATIONS:    1.  Diagnostically, continue to follow patient's physical exam, CBC, CMP, CRP, pro-calcitonin level, blood cultures ×2, urine culture.  Check stool for PCR assay for pathogens.  2.  Therapeutically, continue with vancomycin and Zosyn pending further culture data.  Duration of therapy depends on her blood cultures and clinical response.  3.  Supportive care with IV fluids.  Free water restriction.    I  discussed the patients findings and my recommendations with patient, family and nursing staff.    Thank you for asking me to see Venessa Carlee.  Our group would be pleased to follow this patient over the course of their hospitalization and assist with outpatient antimicrobial therapy, as indicated.  Further recommendations depend on the results of the cultures and clinical course.  Signs and symptoms of infection and side effects of antibiotics discussed with patient.    Pk Kong MD  7/8/2017

## 2017-07-08 NOTE — H&P
Patient Care Team:  No Known Provider as PCP - General    Chief complaint diarrhea    Subjective     Patient is a 57 y.o. female presents with diarrhea.  Patient states that about 4 days ago she started feeling somewhat nauseous and then developed chills.  About 3 days ago patient started having episodes of vomiting as well as diarrhea.  Patient states that over the last 24 hours she has had about 5-10 episodes of diarrhea as well as a couple episodes of vomiting.  States that she has been running fever, however she has not checked her temperature at home as she is on for sure how high the fever has gotten.  Does admit over the last couple days that she's been having decreasing appetite is well stating that she just does not feel like she wants to eat.  States that she has not been around anybody is sick that she is aware of.      Review of Systems   Pertinent items are noted in HPI    History  Past Medical History:   Diagnosis Date   • COPD (chronic obstructive pulmonary disease)    • Depression    • Hyperlipidemia    • Hypertension    • Lupus      History reviewed. No pertinent surgical history.  Current Facility-Administered Medications   Medication Dose Route Frequency Provider Last Rate Last Dose   • sodium chloride 0.9 % flush 10 mL  10 mL Intravenous PRN Elton Burgess MD       • vancomycin (VANCOCIN) in iso-osmotic dextrose IVPB 1 g (premix) 200 mL  20 mg/kg Intravenous Once Elton Burgess  mL/hr at 07/07/17 2239 1,000 mg at 07/07/17 2239     Current Outpatient Prescriptions   Medication Sig Dispense Refill   • albuterol (PROVENTIL HFA;VENTOLIN HFA) 108 (90 BASE) MCG/ACT inhaler Inhale 2 puffs Every 4 (Four) Hours As Needed for Wheezing.     • budesonide-formoterol (SYMBICORT) 80-4.5 MCG/ACT inhaler Inhale 2 puffs 2 (Two) Times a Day.     • dicyclomine (BENTYL) 10 MG capsule Take 10 mg by mouth 4 (Four) Times a Day Before Meals & at Bedtime.     • FLUoxetine (PROzac) 20 MG capsule Take 20 mg by  mouth Daily.     • folic acid (FOLVITE) 1 MG tablet Take 1 mg by mouth Daily.     • meloxicam (MOBIC) 15 MG tablet Take 15 mg by mouth Daily.     • metoprolol tartrate (LOPRESSOR) 50 MG tablet Take 50 mg by mouth 2 (Two) Times a Day.     • pravastatin (PRAVACHOL) 20 MG tablet Take 20 mg by mouth Daily.          •  sodium chloride  No Known Allergies  History reviewed. No pertinent family history.  Social History     Social History   • Marital status: Single     Spouse name: N/A   • Number of children: N/A   • Years of education: N/A     Occupational History   • Not on file.     Social History Main Topics   • Smoking status: Current Every Day Smoker     Packs/day: 1.00     Types: Cigarettes   • Smokeless tobacco: Not on file   • Alcohol use No      Comment: FORMER ALCOHOLIC    • Drug use: No   • Sexual activity: Defer     Other Topics Concern   • Not on file     Social History Narrative   • No narrative on file       Objective     Vital Signs  Temp:  [99.5 °F (37.5 °C)-102.4 °F (39.1 °C)] 99.5 °F (37.5 °C)  Heart Rate:  [132] 132  Resp:  [16-18] 16  BP: (108-143)/(51-91) 108/51    Physical Exam:      General Appearance:    Alert, cooperative, in no acute distress   Head:    Normocephalic, without obvious abnormality, atraumatic   Eyes:            Lids and lashes normal, conjunctivae and sclerae normal, no   icterus, no pallor, corneas clear, PERRLA   Ears:    Ears appear intact with no abnormalities noted   Throat:   No oral lesions, no thrush, oral mucosa moist   Neck:   No adenopathy, supple, trachea midline, no thyromegaly, no     carotid bruit, no JVD   Back:     No kyphosis present, no scoliosis present, no skin lesions,       erythema or scars, no tenderness to percussion or                   palpation,   range of motion normal   Lungs:     Clear to auscultation,respirations regular, even and                   unlabored    Heart:    Regular rhythm and normal rate, normal S1 and S2, no            murmur, no  gallop, no rub, no click   Breast Exam:    Deferred   Abdomen:     Some left costovertebral tenderness noted to palpation and percussion    Genitalia:    Deferred   Extremities:   Moves all extremities well, no edema, no cyanosis, no              redness   Pulses:   Pulses palpable and equal bilaterally   Skin:   No bleeding, bruising or rash   Lymph nodes:   No palpable adenopathy   Neurologic:   Cranial nerves 2 - 12 grossly intact, sensation intact, DTR        present and equal bilaterally       Results Review:    I reviewed the patient's new clinical results.    Assessment/Plan     Active Problems:    Acute febrile illness  Sepsis  Questionable pyelonephritis  Left renal lesions-known to patient  Diarrhea  Vomiting        At this point time blood cultures are pending.  We will continue to patient on antibiotics for the time being and get infectious disease involved for further management.  I will go ahead and start patient on IV fluids as well since she has not been eating or drinking much.  Patient will continue as a full code/full treat.  We'll continue patient's home medication.        I discussed the patients findings and my recommendations with patient and family.     Johan Lu,   07/07/17  11:31 PM

## 2017-07-08 NOTE — PROGRESS NOTES
Pharmacokinetic Consult - Vancomycin Dosing    Venessa Bejarano is a 57 y.o. female who has been consulted for vancomycin dosing for sepsis .    Relevant clinical data and objective history reviewed:  Lab Results   Component Value Date/Time    CREATININE 0.80 07/07/2017 05:28 PM    BUN 13 07/07/2017 05:28 PM     Estimated Creatinine Clearance: 67.5 mL/min (by C-G formula based on Cr of 0.8).     Lab Results   Component Value Date/Time    WBC 13.22 (H) 07/07/2017 05:28 PM    HGB 11.0 (L) 07/07/2017 05:28 PM    HCT 32.0 (L) 07/07/2017 05:28 PM    MCV 85.1 07/07/2017 05:28 PM     07/07/2017 05:28 PM     Temp Readings from Last 3 Encounters:   07/07/17 99.5 °F (37.5 °C) (Oral)     Weight: 118 lb 9.6 oz (53.8 kg)  Baseline culture/source/susceptibility: Pending    Assessment/Plan  The patient will be started on vancomycin utilizing scheduled dosing.  Vancomycin 1000 mg IV x 1 (given) then Vancomycin 750 mg IV Q24H (14 mg/kg/dose).  Plan for trough as patient approaches steady state, prior to the 3rd dose (21:00 dose on Sunday 7/9/17).  Due to infection severity, will target a trough of 15-20 ug/mL.  Pharmacy will continue to follow the patient’s culture results and clinical progress daily.    Olvin Kohli [facility]

## 2017-07-08 NOTE — PROGRESS NOTES
Hospitalist Daily Progress Note    Date of Admission: 7/7/2017   LOS: 1 day   PCP: No Known Provider    Chief Complaint: fevers    Subjective      Patient has been having diarrhea and fevers overnight.  No family today.  Looks dehydrated.  T Max 102.4 in last 24 hours.    History of Present Illness    Review of Systems  General: no fevers, chills  Respiratory: no cough, dyspnea  Cardiovascular: no chest pain, palpitations  Abdomen: No abdominal pain, nausea, vomiting, or diarrhea  Neurologic: No focal weakness    Objective   Physical Exam:  I have reviewed the vital signs.  Temp:  [98 °F (36.7 °C)-102.4 °F (39.1 °C)] 101.3 °F (38.5 °C)  Heart Rate:  [] 86  Resp:  [16-22] 18  BP: (108-143)/(51-91) 142/83    Objective  General Appearance:    Alert, cooperative, no distress  Head:    Normocephalic, atraumatic  Eyes:    Sclerae anicteric  Neck:   Supple, no mass  Lungs: Clear to auscultation bilaterally, respirations unlabored  Heart: Regular rate and rhythm, S1 and S2 normal  Abdomen:  Soft, non-tender, bowel sounds active, nondistended  Extremities: No clubbing, cyanosis, or edema to lower extremities  Pulses:  2+ and symmetric in distal lower extremities  Skin: No rashes   Neurologic: Oriented x3, Normal strength to extremities    Results Review:    I have reviewed the labs, radiology results and diagnostic studies.      Results from last 7 days  Lab Units 07/08/17  0435   WBC 10*3/mm3 9.62   HEMOGLOBIN g/dL 9.2*   PLATELETS 10*3/mm3 230       Results from last 7 days  Lab Units 07/08/17  0938 07/08/17  0435   SODIUM mmol/L  --  125*   POTASSIUM mmol/L 3.8 3.3*   CO2 mmol/L  --  19.0*   CREATININE mg/dL  --  0.80   GLUCOSE mg/dL  --  102*       I have reviewed the medications.    ---------------------------------------------------------------------------------------------  Assessment/Plan   Assessment & Plan  Assessment/Problem List    Active Problems:    Acute febrile illness    57 year old female with febrile  illness and sepsis POA     Plan    Sepsis (Present on Admission)  - broad spectrum IV abx  - procalcitonin 74-->46  - diarrhea  Fevers on Abx  - IV abx  Probable Pyelonephritis  - IV abx  Hypokalemia  - supplement potassium  Chronic Compression Fracture  - incidentally found on CT    IV normal Saline    DVT prophylaxis: Lovenox 40 mg SC  Discharge Planning: I expect patient to be discharged to be determined    Art Mcghee MD 07/08/17 5:51 PM

## 2017-07-09 LAB
ALBUMIN SERPL-MCNC: 3 G/DL (ref 3.2–4.8)
ALBUMIN/GLOB SERPL: 1 G/DL (ref 1.5–2.5)
ALP SERPL-CCNC: 72 U/L (ref 25–100)
ALT SERPL W P-5'-P-CCNC: 17 U/L (ref 7–40)
AMYLASE SERPL-CCNC: 38 U/L (ref 30–118)
ANION GAP SERPL CALCULATED.3IONS-SCNC: 10 MMOL/L (ref 3–11)
AST SERPL-CCNC: 16 U/L (ref 0–33)
BACTERIA SPEC AEROBE CULT: ABNORMAL
BASOPHILS # BLD AUTO: 0.03 10*3/MM3 (ref 0–0.2)
BASOPHILS NFR BLD AUTO: 0.4 % (ref 0–1)
BILIRUB SERPL-MCNC: 0.3 MG/DL (ref 0.3–1.2)
BUN BLD-MCNC: 8 MG/DL (ref 9–23)
BUN/CREAT SERPL: 10 (ref 7–25)
C DIFF TOX GENS STL QL NAA+PROBE: NOT DETECTED
CALCIUM SPEC-SCNC: 8.5 MG/DL (ref 8.7–10.4)
CHLORIDE SERPL-SCNC: 102 MMOL/L (ref 99–109)
CO2 SERPL-SCNC: 17 MMOL/L (ref 20–31)
CREAT BLD-MCNC: 0.8 MG/DL (ref 0.6–1.3)
D-LACTATE SERPL-SCNC: 0.5 MMOL/L (ref 0.5–2)
DEPRECATED RDW RBC AUTO: 44.1 FL (ref 37–54)
EOSINOPHIL # BLD AUTO: 0.06 10*3/MM3 (ref 0–0.3)
EOSINOPHIL NFR BLD AUTO: 0.7 % (ref 0–3)
ERYTHROCYTE [DISTWIDTH] IN BLOOD BY AUTOMATED COUNT: 14.1 % (ref 11.3–14.5)
ERYTHROCYTE [SEDIMENTATION RATE] IN BLOOD: 57 MM/HR (ref 0–30)
GFR SERPL CREATININE-BSD FRML MDRD: 74 ML/MIN/1.73
GLOBULIN UR ELPH-MCNC: 3 GM/DL
GLUCOSE BLD-MCNC: 83 MG/DL (ref 70–100)
HCT VFR BLD AUTO: 27 % (ref 34.5–44)
HGB BLD-MCNC: 9.3 G/DL (ref 11.5–15.5)
IMM GRANULOCYTES # BLD: 0.03 10*3/MM3 (ref 0–0.03)
IMM GRANULOCYTES NFR BLD: 0.4 % (ref 0–0.6)
LIPASE SERPL-CCNC: 26 U/L (ref 6–51)
LYMPHOCYTES # BLD AUTO: 1.2 10*3/MM3 (ref 0.6–4.8)
LYMPHOCYTES NFR BLD AUTO: 14.3 % (ref 24–44)
MCH RBC QN AUTO: 29.2 PG (ref 27–31)
MCHC RBC AUTO-ENTMCNC: 34.4 G/DL (ref 32–36)
MCV RBC AUTO: 84.6 FL (ref 80–99)
MONOCYTES # BLD AUTO: 1.12 10*3/MM3 (ref 0–1)
MONOCYTES NFR BLD AUTO: 13.4 % (ref 0–12)
NEUTROPHILS # BLD AUTO: 5.94 10*3/MM3 (ref 1.5–8.3)
NEUTROPHILS NFR BLD AUTO: 70.8 % (ref 41–71)
PLATELET # BLD AUTO: 282 10*3/MM3 (ref 150–450)
PMV BLD AUTO: 8.8 FL (ref 6–12)
POTASSIUM BLD-SCNC: 3.7 MMOL/L (ref 3.5–5.5)
PROCALCITONIN SERPL-MCNC: 26.49 NG/ML
PROT SERPL-MCNC: 6 G/DL (ref 5.7–8.2)
RBC # BLD AUTO: 3.19 10*6/MM3 (ref 3.89–5.14)
SODIUM BLD-SCNC: 129 MMOL/L (ref 132–146)
WBC NRBC COR # BLD: 8.38 10*3/MM3 (ref 3.5–10.8)

## 2017-07-09 PROCEDURE — 25010000002 ENOXAPARIN PER 10 MG

## 2017-07-09 PROCEDURE — 25010000002 CEFTRIAXONE PER 250 MG: Performed by: INTERNAL MEDICINE

## 2017-07-09 PROCEDURE — 85025 COMPLETE CBC W/AUTO DIFF WBC: CPT | Performed by: INTERNAL MEDICINE

## 2017-07-09 PROCEDURE — 84145 PROCALCITONIN (PCT): CPT | Performed by: INTERNAL MEDICINE

## 2017-07-09 PROCEDURE — 83605 ASSAY OF LACTIC ACID: CPT | Performed by: INTERNAL MEDICINE

## 2017-07-09 PROCEDURE — 99231 SBSQ HOSP IP/OBS SF/LOW 25: CPT | Performed by: NURSE PRACTITIONER

## 2017-07-09 PROCEDURE — 25010000002 PIPERACILLIN-TAZOBACTAM: Performed by: FAMILY MEDICINE

## 2017-07-09 PROCEDURE — 94799 UNLISTED PULMONARY SVC/PX: CPT

## 2017-07-09 PROCEDURE — 85652 RBC SED RATE AUTOMATED: CPT | Performed by: INTERNAL MEDICINE

## 2017-07-09 PROCEDURE — 80053 COMPREHEN METABOLIC PANEL: CPT | Performed by: INTERNAL MEDICINE

## 2017-07-09 PROCEDURE — 83690 ASSAY OF LIPASE: CPT | Performed by: INTERNAL MEDICINE

## 2017-07-09 PROCEDURE — 87493 C DIFF AMPLIFIED PROBE: CPT | Performed by: HOSPITALIST

## 2017-07-09 PROCEDURE — 82150 ASSAY OF AMYLASE: CPT | Performed by: INTERNAL MEDICINE

## 2017-07-09 PROCEDURE — 94640 AIRWAY INHALATION TREATMENT: CPT

## 2017-07-09 RX ORDER — CEFTRIAXONE SODIUM 2 G/50ML
2 INJECTION, SOLUTION INTRAVENOUS EVERY 24 HOURS
Status: DISCONTINUED | OUTPATIENT
Start: 2017-07-09 | End: 2017-07-09

## 2017-07-09 RX ADMIN — DICYCLOMINE HYDROCHLORIDE 10 MG: 10 CAPSULE ORAL at 12:35

## 2017-07-09 RX ADMIN — ENOXAPARIN SODIUM 40 MG: 40 INJECTION SUBCUTANEOUS at 09:00

## 2017-07-09 RX ADMIN — MELOXICAM 15 MG: 7.5 TABLET ORAL at 08:59

## 2017-07-09 RX ADMIN — SODIUM CHLORIDE 125 ML/HR: 9 INJECTION, SOLUTION INTRAVENOUS at 20:43

## 2017-07-09 RX ADMIN — DICYCLOMINE HYDROCHLORIDE 10 MG: 10 CAPSULE ORAL at 20:42

## 2017-07-09 RX ADMIN — DICYCLOMINE HYDROCHLORIDE 10 MG: 10 CAPSULE ORAL at 08:59

## 2017-07-09 RX ADMIN — DICYCLOMINE HYDROCHLORIDE 10 MG: 10 CAPSULE ORAL at 17:11

## 2017-07-09 RX ADMIN — PIPERACILLIN SODIUM,TAZOBACTAM SODIUM 3.38 G: 3; .375 INJECTION, POWDER, FOR SOLUTION INTRAVENOUS at 09:49

## 2017-07-09 RX ADMIN — BUDESONIDE AND FORMOTEROL FUMARATE DIHYDRATE 2 PUFF: 80; 4.5 AEROSOL RESPIRATORY (INHALATION) at 08:41

## 2017-07-09 RX ADMIN — FLUOXETINE HYDROCHLORIDE 20 MG: 20 CAPSULE ORAL at 08:59

## 2017-07-09 RX ADMIN — FOLIC ACID 1 MG: 1 TABLET ORAL at 08:59

## 2017-07-09 RX ADMIN — PIPERACILLIN SODIUM,TAZOBACTAM SODIUM 3.38 G: 3; .375 INJECTION, POWDER, FOR SOLUTION INTRAVENOUS at 04:07

## 2017-07-09 RX ADMIN — METOPROLOL TARTRATE 50 MG: 50 TABLET, FILM COATED ORAL at 08:59

## 2017-07-09 RX ADMIN — METOPROLOL TARTRATE 50 MG: 50 TABLET, FILM COATED ORAL at 20:42

## 2017-07-09 RX ADMIN — BUDESONIDE AND FORMOTEROL FUMARATE DIHYDRATE 2 PUFF: 80; 4.5 AEROSOL RESPIRATORY (INHALATION) at 19:08

## 2017-07-09 RX ADMIN — CEFTRIAXONE 2 G: 1 INJECTION, POWDER, FOR SOLUTION INTRAMUSCULAR; INTRAVENOUS at 14:45

## 2017-07-09 NOTE — PROGRESS NOTES
[facility] Progress Note    Admission Date: 7/7/2017    Venessa Bejarano  1960  [id number]    Date: 7/9/2017    Antibiotics:  IV Anti-Infectives     Ordered     Dose/Rate Route Frequency Start Stop    07/08/17 1938  piperacillin-tazobactam (ZOSYN) 3.375 g/100 mL 0.9% NS IVPB (mbp)     Ordering Provider:  Johan Lu DO    3.375 g  over 0.5 Hours Intravenous Every 6 Hours 07/08/17 2200      07/07/17 2359  vancomycin in dextrose 5% 150 mL (VANCOCIN) IVPB 750 mg     Ordering Provider:  Olvin Kohli RPH    15 mg/kg × 53.8 kg  over 60 Minutes Intravenous Every 24 Hours 07/08/17 2100      07/07/17 2345  Pharmacy to dose vancomycin     Ordering Provider:  Johan Lu DO     Does not apply Continuous PRN 07/07/17 2345      07/07/17 2143  vancomycin (VANCOCIN) in iso-osmotic dextrose IVPB 1 g (premix) 200 mL     Ordering Provider:  Elton Burgess MD    20 mg/kg × 54.4 kg  over 60 Minutes Intravenous Once 07/07/17 2145 07/07/17 2339    07/07/17 2143  piperacillin-tazobactam (ZOSYN) 4.5 g/100 mL 0.9% NS IVPB (mbp)     Ordering Provider:  Elton Burgess MD    4.5 g Intravenous Once 07/07/17 2144 07/07/17 2236             CC:  Left pyelonephritis, sepsis, E coli.    HPI:  Patient is a very pleasant 57 y.o. Yr old female with previous history of COPD, previous CVA 2014, lupus, who was admitted to [facility] on 7/7/17 with diarrhea, fever to 102 Fahrenheit. Patient developed nausea, chills, vomiting approximately 7/3/17. Patient's diarrhea increased over the past 24 hours to 5-10 episodes with vomiting. She does not recall ill contacts, zoonotic exposures, significant travel history, or immunocompromised state. Her white blood cell count had 13.2, 78% neutrophils, hemoglobin 11.0, platelets 286, creatinine 0.8, sodium 122, urinalysis with 6-12 WBCs, large leukocyte esterase, and a urine culture from 7/7 that is greater than 100,000 colony-forming units per mL gram-negative bacilli. Liver  function tests unremarkable. Lipase 26. Her chest x-ray was negative. A CT scan of the abdomen and pelvis from 7/7 revealed perinephric stranding of the kidneys more prominent on the left, and small left renal lesions not compatible with simple cyst (she has been followed for renal lesions since 2014 in [place]). The patient was started empirically on vancomycin and Zosyn. I was consulted on 7/8/17 for further evaluation and treatment. The patient denies flank pain, but did have some lower back pain past 3-4 days.  7/9/17 hx rev.  [place] abx.  Feeling better with fever, no pain.    ROS:  No f/c/s. No n/v/d. No rash. No new ADR to Abx.     Constitutional-- No Fever, chills or sweats.  Appetite good, and no malaise. No fatigue.  Heent-- No new vision, hearing or throat complaints.  No epistaxis or oral sores.  Denies odynophagia or dysphagia.  No flashers, floaters or eye pain. No odynophagia or dysphagia. No headache, photophobia or neck stiffness.  CV-- No chest pain, palpitation or syncope  Resp-- No SOB/cough/Hemoptysis  GI- No nausea, vomiting, or diarrhea.  No hematochezia, melena, or hematemesis. Denies jaundice or chronic liver disease.  -- No dysuria, hematuria, or flank pain.  Denies hesitancy, urgency or flank pain.  Lymph- no swollen lymph nodes in neck/axilla or groin.   Heme- No active bruising or bleeding; no Hx of DVT or PE.  MS-- no swelling or pain in the bones or joints of arms/legs.  No new back pain.  Neuro-- No acute focal weakness or numbness in the arms or legs.  No seizures.  Skin--No rash, nodules, blisters.    Objective   PE:  Vital Signs  Temp  Min: 98.7 °F (37.1 °C)  Max: 98.8 °F (37.1 °C)  BP  Min: 137/77  Max: 140/90  Pulse  Min: 84  Max: 109  Resp  Min: 16  Max: 18  No Data Recorded    GENERAL: Awake and alert, in mild distress.   HEENT: Normocephalic, atraumatic.  PERRL. EOMI. No conjunctival injection. No icterus. Oropharynx clear without evidence of thrush or exudate. No evidence of  peridontal disease.    NECK: Supple without nuchal rigidity. No mass.  LYMPH: No cervical, axillary or inguinal lymphadenopathy.  HEART: RRR; No murmur, rubs, gallops.   LUNGS: Clear to auscultation bilaterally without wheezing, rales, rhonchi. Normal respiratory effort. Nonlabored. No dullness.  ABDOMEN: Soft, nontender, nondistended. Positive bowel sounds. No rebound or guarding. NO mass or HSM.  Obese.  EXT:  No cyanosis, clubbing or edema. No cord.  MSK: FROM without joint effusions noted arms/legs.    SKIN: Warm and dry without cutaneous eruptions on Inspection/palpation.    NO CVAT.  NEURO: Oriented to PPT. No focal deficits on motor/sensory exam at arms/legs.    Laboratory Data      Results from last 7 days  Lab Units 07/09/17  0445 07/08/17  0435 07/07/17  1728   WBC 10*3/mm3 8.38 9.62 13.22*   HEMOGLOBIN g/dL 9.3* 9.2* 11.0*   HEMATOCRIT % 27.0* 26.7* 32.0*   PLATELETS 10*3/mm3 282 230 286       Results from last 7 days  Lab Units 07/09/17  0445   SODIUM mmol/L 129*   POTASSIUM mmol/L 3.7   CHLORIDE mmol/L 102   CO2 mmol/L 17.0*   BUN mg/dL 8*   CREATININE mg/dL 0.80   GLUCOSE mg/dL 83   CALCIUM mg/dL 8.5*       Results from last 7 days  Lab Units 07/09/17  0445   ALK PHOS U/L 72   BILIRUBIN mg/dL 0.3   ALT (SGPT) U/L 17   AST (SGOT) U/L 16       Results from last 7 days  Lab Units 07/09/17  0445   SED RATE mm/hr 57*                   Results from last 7 days  Lab Units 07/09/17  0445   LACTATE mmol/L 0.5     Estimated Creatinine Clearance: 69.2 mL/min (by C-G formula based on Cr of 0.8).      Microbiology:  Urine cx 7/7 E coli, blood cx neg.    Radiology:  Imaging Results (last 72 hours)     Procedure Component Value Units Date/Time    CT Abdomen Pelvis With Contrast [[id number]]  (Abnormal) Collected:  07/07/17 1941     Updated:  07/07/17 2134    Narrative:       EXAM:    CT Abdomen and Pelvis With Intravenous Contrast    CLINICAL HISTORY:    57 years, female; Pain; Abdominal pain; Other: See notes;  Additional info:   Abd pain, fever    TECHNIQUE:    Axial computed tomography images of the abdomen and pelvis with intravenous   contrast.  This CT exam was performed using one or more of the following dose   reduction techniques:  automated exposure control, adjustment of the mA and/or   kV according to patient size, and/or use of iterative reconstruction technique.    Coronal reformatted images were created and reviewed.    CONTRAST:    80 mL of isovue 300 administered intravenously.    COMPARISON:    No relevant prior studies available.    FINDINGS:    Lower thorax: No acute findings.     ABDOMEN:    Liver:  Unremarkable.  No obvious mass.    Gallbladder and bile ducts:  Cholelithiasis is noted. No obvious gallbladder   wall thickening or pericholecytistic inflammatory changes. No significant   biliary ductal dilatation appreciated.    Pancreas:  Unremarkable.  No ductal dilation.  No obvious mass.    Spleen:  Unremarkable.  No splenomegaly.    Adrenals:  Unremarkable.  No adrenal nodules or masses identified.    Kidneys and ureters:  There is mild heterogeneous enhancement of the kidneys,   best demonstrated on the delayed phase.  There is also mild perinephric   stranding which is slightly more prominent on the left.  Findings are   suspicious for pyelonephritis.  No hydronephrosis.  No renal or ureteral   stones.  There are small hypodense lesions at the upper pole of the left   kidney, one of which is compatible with a simple cyst.  The more lateral lesion   is slightly more dense than a simple cyst, and measures 1.5 cm.  There is an   additional lesion in the upper to midpole the left kidney laterally which   demonstrates coarse calcifications within it.  This measures 2.1 cm in   diameter.  Just inferior to this, within the mid to lower pole, there is an   additional hypodense lesion which likely represents a cyst.    Stomach and bowel:  No evidence of bowel obstruction.  No significant bowel   wall  thickening appreciated.    Appendix: Normal appendix.     PELVIS:    Bladder:  Unremarkable.  No obvious mass.    Reproductive:  The uterus is atrophic.     ABDOMEN and PELVIS:    Intraperitoneal space:  Unremarkable.  No free air.  No significant fluid   collection.    Bones/joints:  Degenerative changes of the spine.  There is lumbarization of   L5.  There is a compression fracture of L1 with severe associated vertebral   body height loss and retropulsion.  However, this is favored to be chronic.    Soft tissues:  No significant abnormalities in the superficial soft tissues.    Vasculature:  Atherosclerotic calcifications are noted. No aortic aneurysm.    Lymph nodes:  No significant lymph node enlargement.      Impression:       1.  Mild heterogeneous enhancement of the kidneys with associated perinephric   stranding which is more prominent on the left.  Findings are suspicious for   pyelonephritis.  Recommend clinical and laboratory correlation.  2.  Small left renal lesions, some of which are not compatible with simple   cysts.  One of these demonstrates coarse calcifications, compatible with a   Bosniak IIF cyst.    ACR White Paper guidelines (Gallo, et al. JACR 2010;   7(10):753-15) suggest CT or MRI follow-up in 6 months.  3.  L1 compression fracture with severe vertebral body height loss and   associated retropulsion.  This is favored to be chronic.      THIS DOCUMENT HAS BEEN ELECTRONICALLY SIGNED BY JOHNNY QUIROZ MD    XR Chest 1 View [[id number]]  (Abnormal) Collected:  07/07/17 2143     Updated:  07/07/17 2233    Narrative:       EXAM:    XR Chest, 1 View    CLINICAL HISTORY:    57 years, female; Disorder of kidney and ureter, unspecified; Fever,   unspecified; Signs and symptoms    TECHNIQUE:    Frontal view of the chest.    COMPARISON:    CT ABDOMEN PELVIS W CONTRAST 7/7/2017 8:38:45 PM    FINDINGS:    Lungs: Nipple shadow visualized projecting over the right lower chest. The   lungs are otherwise clear.   No consolidation.    Pleural space:  Unremarkable.  No pneumothorax.    Heart:  Unremarkable.  No cardiomegaly.    Mediastinum:  Unremarkable.    Bones/joints:  No acute osseous findings.    Vasculature:  Atherosclerotic calcifications are visualized within the aorta.      Impression:         No acute findings visualized in the chest.     THIS DOCUMENT HAS BEEN ELECTRONICALLY SIGNED BY JOHNNY QUIROZ MD          I personally reviewed the radiographic studies     Assessment/Plan   IMPRESSION:     1. Sepsis, present on admission, possibly related to left pyelonephritis versus gastroenteritis. Urine is growing gram-negative rods. Likely Enterobacteriaceae. Less likely gastroenteritis with normal virus, salmonella, Shigella, Campylobacter, versus other.   2. Left pyelonephritis E coli.  3. Diarrhea, possible gastroenteritis versus C. difficile versus other.  4. Leukocytosis, neutrophilic related to causes listed above.  5. Anemia, chronic disease and related to acute infection.  6. Hyponatremia 129, improved.  7. Hypokalemia 3.3, resolved.  8. Hypocalcemia 8.5.     RECOMMENDATIONS:     1. Diagnostically, continue to follow patient's physical exam, CBC, CMP, CRP, pro-calcitonin level, blood cultures ×2, urine culture.   2. Therapeutically, change to Rocephin 2 g iv daily. Duration of therapy depends on her blood cultures and clinical response, but likely till 7/15/17, and may change to po levoflox for discharge in am.  3. Supportive care with IV fluids. Free water restriction.    Increased risk for ADR, recurrent infxn.    Pk Kong MD  7/9/2017

## 2017-07-09 NOTE — PLAN OF CARE
Problem: Infection, Risk/Actual (Adult)  Goal: Infection Prevention/Resolution  Outcome: Ongoing (interventions implemented as appropriate)    07/09/17 0258   Infection, Risk/Actual (Adult)   Infection Prevention/Resolution making progress toward outcome  (stool cultures negative, blood cultures no growth first 24hr)

## 2017-07-09 NOTE — PROGRESS NOTES
"Hospitalist Daily Progress Note    Date of Admission: 7/7/2017   LOS: 2 days   PCP: No Known Provider    Chief Complaint: fevers  Subjective    Patient sitting up in bed in NAD.  Patient states she has been fever free for almost 24 hours.  Patient had a temperature spike of 101°F around noon yesterday.  Patient states that her watery diarrhea is improving and her stools are no longer watery, but are soft, still not performed.  Patient states she's feeling much better.    Abdominal Pain       Review of Systems   Gastrointestinal: Positive for abdominal pain.   General: no fevers, chills  Respiratory: no cough, dyspnea  Cardiovascular: no chest pain, palpitations  Abdomen: Soft stools.  No abdominal pain, nausea, vomiting  Neurologic: No focal weakness    Objective   Physical Exam:  I have reviewed the vital signs.  Objective:  Vital signs: (most recent): Blood pressure 137/77, pulse 109, temperature 98.8 °F (37.1 °C), temperature source Oral, resp. rate 18, height 62\" (157.5 cm), weight 121 lb 9.6 oz (55.2 kg), SpO2 92 %.          General: Alert, well-developed well-nourished female in no acute distress    Head: Normocephalic atraumatic    Eyes: PERRLA, EOMI, nonicteric, conjunctiva normal    ENT: Pink, moist mucous membranes    Neck: Supple, nontender, trachea midline without lymphadenopathy, JVD, nuchal rigidity.      Cardiovascular: RRR  no M/R/G + 2 DP pulses bilaterally    Respiratory: Nonlabored, symmetrical chest expansion, clear to auscultation bilaterally    Abdomen: Obese, soft, nontender, nondistended,  hyperactiveounds in all 4 quadrants     Extremities: FROM in upper and lower extremities bilaterally.   negative for edema/cyanosis/clubbing.  Negative calf pain    Skin: Pink/warm/dry.  No rash or lesions noted    Neuro: Alert and oriented to person place time and situation, speech is clear, follows all commands, recent and remote memory intact    Psych: Patient is pleasant and cooperative.  Normal affect. "  Negative suicidal ideation or homicidal ideation.    Results Review:    I have reviewed the labs, radiology results and diagnostic studies.    Results from last 7 days  Lab Units 07/09/17  0445   WBC 10*3/mm3 8.38   HEMOGLOBIN g/dL 9.3*   PLATELETS 10*3/mm3 282       Results from last 7 days  Lab Units 07/09/17  0445   SODIUM mmol/L 129*   POTASSIUM mmol/L 3.7   CO2 mmol/L 17.0*   CREATININE mg/dL 0.80   GLUCOSE mg/dL 83     Microbiology Results (last 10 days)     Procedure Component Value - Date/Time    Stool Culture [[id number]] Collected:  07/08/17 1418    Lab Status:  Preliminary result Specimen:  Stool from Per Rectum Updated:  07/09/17 0843     Stool Culture Culture in progress    Narrative:         Not cultured for Yersinia.    Blood Culture [[id number]]  (Normal) Collected:  07/07/17 1935    Lab Status:  Preliminary result Specimen:  Blood from Arm, Left Updated:  07/08/17 2001     Blood Culture No growth at 24 hours    Blood Culture [[id number]]  (Normal) Collected:  07/07/17 1907    Lab Status:  Preliminary result Specimen:  Blood from Arm, Right Updated:  07/08/17 2001     Blood Culture No growth at 24 hours    Urine Culture [[id number]]  (Abnormal) Collected:  07/07/17 1730    Lab Status:  Preliminary result Specimen:  Urine from Urine, Clean Catch Updated:  07/08/17 0846     Urine Culture >100,000 CFU/mL Gram Negative Bacilli (A)        Imaging Results (last 24 hours)     ** No results found for the last 24 hours. **        Per pt:  C-diff negative    I have reviewed the medications  ---------------------------------------------------------------------------------------------  Assessment/Plan   Assessment & Plan  Assessment/Problem List  Active Problems:    Acute febrile illness    57 year old female with febrile illness and sepsis POA    Plan  Sepsis (Present on Admission)  - broad spectrum IV abx  - IV normal saline  - procalcitonin 74-->46-->26  - diarrhea  Fevers on Abx  - Resolving, but will watch  today for a fever spike  - IV abx  Probable Pyelonephritis  - IV abx  Hypokalemia  - supplement potassium  Chronic Compression Fracture  - incidentally found on CT    DVT prophylaxis: Lovenox 40 mg SC  CODE STATUS: Full code   Discharge Planning: I expect patient to be discharged to be determined    LUIS Patel 07/09/17 9:05 AM

## 2017-07-10 VITALS
WEIGHT: 125.4 LBS | RESPIRATION RATE: 18 BRPM | HEART RATE: 99 BPM | HEIGHT: 62 IN | SYSTOLIC BLOOD PRESSURE: 164 MMHG | DIASTOLIC BLOOD PRESSURE: 94 MMHG | OXYGEN SATURATION: 92 % | TEMPERATURE: 98.6 F | BODY MASS INDEX: 23.08 KG/M2

## 2017-07-10 PROBLEM — N12 PYELONEPHRITIS: Status: ACTIVE | Noted: 2017-07-10

## 2017-07-10 PROBLEM — I10 ESSENTIAL HYPERTENSION: Chronic | Status: ACTIVE | Noted: 2017-07-10

## 2017-07-10 PROBLEM — M32.9 LUPUS (HCC): Chronic | Status: ACTIVE | Noted: 2017-07-10

## 2017-07-10 PROBLEM — E78.5 HYPERLIPIDEMIA: Chronic | Status: ACTIVE | Noted: 2017-07-10

## 2017-07-10 LAB
ALBUMIN SERPL-MCNC: 3.2 G/DL (ref 3.2–4.8)
ALBUMIN/GLOB SERPL: 1 G/DL (ref 1.5–2.5)
ALP SERPL-CCNC: 72 U/L (ref 25–100)
ALT SERPL W P-5'-P-CCNC: 20 U/L (ref 7–40)
ANION GAP SERPL CALCULATED.3IONS-SCNC: 6 MMOL/L (ref 3–11)
AST SERPL-CCNC: 35 U/L (ref 0–33)
BACTERIA SPEC AEROBE CULT: NORMAL
BASOPHILS # BLD AUTO: 0.06 10*3/MM3 (ref 0–0.2)
BASOPHILS NFR BLD AUTO: 0.7 % (ref 0–1)
BILIRUB SERPL-MCNC: 0.2 MG/DL (ref 0.3–1.2)
BUN BLD-MCNC: <5 MG/DL (ref 9–23)
BUN/CREAT SERPL: ABNORMAL (ref 7–25)
CALCIUM SPEC-SCNC: 8.5 MG/DL (ref 8.7–10.4)
CHLORIDE SERPL-SCNC: 102 MMOL/L (ref 99–109)
CO2 SERPL-SCNC: 21 MMOL/L (ref 20–31)
CREAT BLD-MCNC: 0.7 MG/DL (ref 0.6–1.3)
DEPRECATED RDW RBC AUTO: 46.9 FL (ref 37–54)
EOSINOPHIL # BLD AUTO: 0.08 10*3/MM3 (ref 0–0.3)
EOSINOPHIL NFR BLD AUTO: 0.9 % (ref 0–3)
ERYTHROCYTE [DISTWIDTH] IN BLOOD BY AUTOMATED COUNT: 14.5 % (ref 11.3–14.5)
GFR SERPL CREATININE-BSD FRML MDRD: 87 ML/MIN/1.73
GLOBULIN UR ELPH-MCNC: 3.1 GM/DL
GLUCOSE BLD-MCNC: 94 MG/DL (ref 70–100)
HCT VFR BLD AUTO: 29.4 % (ref 34.5–44)
HGB BLD-MCNC: 9.8 G/DL (ref 11.5–15.5)
IMM GRANULOCYTES # BLD: 0.03 10*3/MM3 (ref 0–0.03)
IMM GRANULOCYTES NFR BLD: 0.3 % (ref 0–0.6)
LYMPHOCYTES # BLD AUTO: 1.17 10*3/MM3 (ref 0.6–4.8)
LYMPHOCYTES NFR BLD AUTO: 12.9 % (ref 24–44)
MCH RBC QN AUTO: 29.3 PG (ref 27–31)
MCHC RBC AUTO-ENTMCNC: 33.3 G/DL (ref 32–36)
MCV RBC AUTO: 87.8 FL (ref 80–99)
MONOCYTES # BLD AUTO: 0.96 10*3/MM3 (ref 0–1)
MONOCYTES NFR BLD AUTO: 10.6 % (ref 0–12)
NEUTROPHILS # BLD AUTO: 6.76 10*3/MM3 (ref 1.5–8.3)
NEUTROPHILS NFR BLD AUTO: 74.6 % (ref 41–71)
PLATELET # BLD AUTO: 313 10*3/MM3 (ref 150–450)
PMV BLD AUTO: 8.8 FL (ref 6–12)
POTASSIUM BLD-SCNC: 3.5 MMOL/L (ref 3.5–5.5)
PROT SERPL-MCNC: 6.3 G/DL (ref 5.7–8.2)
RBC # BLD AUTO: 3.35 10*6/MM3 (ref 3.89–5.14)
REF LAB TEST RESULTS: NORMAL
SODIUM BLD-SCNC: 129 MMOL/L (ref 132–146)
WBC NRBC COR # BLD: 9.06 10*3/MM3 (ref 3.5–10.8)

## 2017-07-10 PROCEDURE — 93005 ELECTROCARDIOGRAM TRACING: CPT | Performed by: NURSE PRACTITIONER

## 2017-07-10 PROCEDURE — 80053 COMPREHEN METABOLIC PANEL: CPT | Performed by: INTERNAL MEDICINE

## 2017-07-10 PROCEDURE — 85025 COMPLETE CBC W/AUTO DIFF WBC: CPT | Performed by: INTERNAL MEDICINE

## 2017-07-10 PROCEDURE — G0009 ADMIN PNEUMOCOCCAL VACCINE: HCPCS | Performed by: FAMILY MEDICINE

## 2017-07-10 PROCEDURE — 25010000002 ENOXAPARIN PER 10 MG

## 2017-07-10 PROCEDURE — 99239 HOSP IP/OBS DSCHRG MGMT >30: CPT | Performed by: NURSE PRACTITIONER

## 2017-07-10 PROCEDURE — 93010 ELECTROCARDIOGRAM REPORT: CPT | Performed by: INTERNAL MEDICINE

## 2017-07-10 PROCEDURE — 94640 AIRWAY INHALATION TREATMENT: CPT

## 2017-07-10 PROCEDURE — 90732 PPSV23 VACC 2 YRS+ SUBQ/IM: CPT | Performed by: FAMILY MEDICINE

## 2017-07-10 PROCEDURE — 25010000002 PNEUMOCOCCAL VAC POLYVALENT PER 0.5 ML: Performed by: FAMILY MEDICINE

## 2017-07-10 RX ORDER — LEVOFLOXACIN 750 MG/1
750 TABLET ORAL DAILY
Qty: 5 TABLET | Refills: 0 | Status: SHIPPED | OUTPATIENT
Start: 2017-07-10 | End: 2017-07-15

## 2017-07-10 RX ADMIN — METOPROLOL TARTRATE 50 MG: 50 TABLET, FILM COATED ORAL at 08:55

## 2017-07-10 RX ADMIN — PNEUMOCOCCAL VACCINE POLYVALENT 0.5 ML
25; 25; 25; 25; 25; 25; 25; 25; 25; 25; 25; 25; 25; 25; 25; 25; 25; 25; 25; 25; 25; 25; 25 INJECTION, SOLUTION INTRAMUSCULAR; SUBCUTANEOUS at 15:11

## 2017-07-10 RX ADMIN — BUDESONIDE AND FORMOTEROL FUMARATE DIHYDRATE 2 PUFF: 80; 4.5 AEROSOL RESPIRATORY (INHALATION) at 07:16

## 2017-07-10 RX ADMIN — ENOXAPARIN SODIUM 40 MG: 40 INJECTION SUBCUTANEOUS at 08:56

## 2017-07-10 RX ADMIN — DICYCLOMINE HYDROCHLORIDE 10 MG: 10 CAPSULE ORAL at 08:54

## 2017-07-10 RX ADMIN — DICYCLOMINE HYDROCHLORIDE 10 MG: 10 CAPSULE ORAL at 13:24

## 2017-07-10 RX ADMIN — MELOXICAM 15 MG: 7.5 TABLET ORAL at 08:56

## 2017-07-10 RX ADMIN — FLUOXETINE HYDROCHLORIDE 20 MG: 20 CAPSULE ORAL at 08:54

## 2017-07-10 RX ADMIN — FOLIC ACID 1 MG: 1 TABLET ORAL at 08:55

## 2017-07-10 NOTE — PROGRESS NOTES
Discharge Planning Assessment  [facility]     Patient Name: Venessa Bejarano  MRN: [id number]  Today's Date: 7/10/2017    Admit Date: 7/7/2017          Discharge Needs Assessment       07/10/17 1242    Living Environment    Lives With other (see comments)   Staying in [facility] with son and DIL (she lives in [place])    Transportation Available car;family or friend will provide    Living Environment    Provides Primary Care For no one    Quality Of Family Relationships supportive    Able to Return to Prior Living Arrangements yes    Discharge Needs Assessment    Concerns To Be Addressed denies needs/concerns at this time    Readmission Within The Last 30 Days no previous admission in last 30 days    Anticipated Changes Related to Illness none    Equipment Currently Used at Home none    Equipment Needed After Discharge none            Discharge Plan       07/10/17 1243    Case Management/Social Work Plan    Plan HOME with son/DIL    Patient/Family In Agreement With Plan yes    Additional Comments Met with pt at bedside.  She resides in [place] but is staying in [facility] with her son and DIL following the birth of her grandchild.  She is independent with ADLs.  No current HH or DME.  Confirmed she ahs Medicare with Rx coverage.  Goal is to stay short-term with her son upon DC prior to returning to [place].  No immediate needs identified/voiced.  CM will cont to follow.        Discharge Placement     No information found        Expected Discharge Date and Time     Expected Discharge Date Expected Discharge Time    Jul 10, 2017               Demographic Summary       07/10/17 1242    Referral Information    Admission Type inpatient    Referral Source admission list    Reason For Consult discharge planning    Record Reviewed history and physical;medical record    Contact Information    Permission Granted to Share Information With             Functional Status       07/10/17 1242    Functional Status  Prior    Ambulation 0-->independent    Transferring 0-->independent    Toileting 0-->independent    Bathing 0-->independent    Dressing 0-->independent    Eating 0-->independent    Communication 0-->understands/communicates without difficulty    IADL    Medications independent    Meal Preparation independent    Housekeeping independent    Laundry independent    Shopping independent    Oral Care independent    Activity Tolerance    Usual Activity Tolerance good            Psychosocial     None            Abuse/Neglect     None            Legal     None            Substance Abuse     None            Patient Forms     None          Jennifer Loya

## 2017-07-10 NOTE — PROGRESS NOTES
[facility] Progress Note    Admission Date: 7/7/2017    Venessa Bejarano  1960  [id number]    Date: 7/10/2017    Antibiotics:  IV Anti-Infectives     Ordered     Dose/Rate Route Frequency Start Stop    07/09/17 1335  cefTRIAXone (ROCEPHIN) 2 g in sodium chloride 0.9 % 100 mL IVPB     Ordering Provider:  Pk Kong MD    2 g  over 30 Minutes Intravenous Every 24 Hours 07/09/17 1500      07/07/17 2143  vancomycin (VANCOCIN) in iso-osmotic dextrose IVPB 1 g (premix) 200 mL     Ordering Provider:  Elton Burgess MD    20 mg/kg × 54.4 kg  over 60 Minutes Intravenous Once 07/07/17 2145 07/07/17 2339    07/07/17 2143  piperacillin-tazobactam (ZOSYN) 4.5 g/100 mL 0.9% NS IVPB (mbp)     Ordering Provider:  Elton Burgess MD    4.5 g Intravenous Once 07/07/17 2144 07/07/17 2236             CC:  Left pyelonephritis, sepsis, E coli.    HPI:  Patient is a very pleasant 57 y.o. Yr old female with previous history of COPD, previous CVA 2014, lupus, who was admitted to [facility] on 7/7/17 with diarrhea, fever to 102 Fahrenheit. Patient developed nausea, chills, vomiting approximately 7/3/17. Patient's diarrhea increased over the past 24 hours to 5-10 episodes with vomiting. She does not recall ill contacts, zoonotic exposures, significant travel history, or immunocompromised state. Her white blood cell count had 13.2, 78% neutrophils, hemoglobin 11.0, platelets 286, creatinine 0.8, sodium 122, urinalysis with 6-12 WBCs, large leukocyte esterase, and a urine culture from 7/7 that is greater than 100,000 colony-forming units per mL gram-negative bacilli. Liver function tests unremarkable. Lipase 26. Her chest x-ray was negative. A CT scan of the abdomen and pelvis from 7/7 revealed perinephric stranding of the kidneys more prominent on the left, and small left renal lesions not compatible with simple cyst (she has been followed for renal lesions since 2014 in [place]). The patient was started  empirically on vancomycin and Zosyn. I was consulted on 7/8/17 for further evaluation and treatment. The patient denies flank pain, but did have some lower back pain past 3-4 days.  7/9/17 hx rev.  [place] abx.  Feeling better with fever, no pain.  7/10/17 history reviewed.  Continues to feel better.  Some loose stool but no jose diarrhea.  No fever.    ROS:  No f/c/s. No n/v/d. No rash. No new ADR to Abx.     Constitutional-- No Fever, chills or sweats.  Appetite good, and no malaise. No fatigue.  Heent-- No new vision, hearing or throat complaints.  No epistaxis or oral sores.  Denies odynophagia or dysphagia.  No flashers, floaters or eye pain. No odynophagia or dysphagia. No headache, photophobia or neck stiffness.  CV-- No chest pain, palpitation or syncope  Resp-- No SOB/cough/Hemoptysis  GI- No nausea, vomiting, or diarrhea.  No hematochezia, melena, or hematemesis. Denies jaundice or chronic liver disease.  -- No dysuria, hematuria, or flank pain.  Denies hesitancy, urgency or flank pain.  Lymph- no swollen lymph nodes in neck/axilla or groin.   Heme- No active bruising or bleeding; no Hx of DVT or PE.  MS-- no swelling or pain in the bones or joints of arms/legs.  No new back pain.  Neuro-- No acute focal weakness or numbness in the arms or legs.  No seizures.  Skin--No rash, nodules, blisters.    Objective   PE:  Vital Signs  Temp  Min: 98.1 °F (36.7 °C)  Max: 98.6 °F (37 °C)  BP  Min: 148/92  Max: 164/94  Pulse  Min: 78  Max: 113  Resp  Min: 16  Max: 18  No Data Recorded    GENERAL: Awake and alert, in minimal distress.   HEENT: Normocephalic, atraumatic.  PERRL. EOMI. No conjunctival injection. No icterus. Oropharynx clear without evidence of thrush or exudate. No evidence of peridontal disease.    NECK: Supple without nuchal rigidity. No mass.  LYMPH: No cervical, axillary or inguinal lymphadenopathy.  HEART: RRR; No murmur, rubs, gallops.  No JVD.  LUNGS: Clear to auscultation bilaterally without  wheezing, rales, rhonchi. Normal respiratory effort. Nonlabored. No dullness.  ABDOMEN: Soft, nontender, nondistended. Positive bowel sounds. No rebound or guarding. NO mass or HSM.  Obese.  EXT:  No cyanosis, clubbing or edema. No cord.  MSK: FROM without joint effusions noted arms/legs.    SKIN: Warm and dry without cutaneous eruptions on Inspection/palpation.    NO CVAT.  NEURO: Oriented to PPT. No focal deficits on motor/sensory exam at arms/legs.    Laboratory Data      Results from last 7 days  Lab Units 07/10/17  0443 07/09/17  0445 07/08/17  0435   WBC 10*3/mm3 9.06 8.38 9.62   HEMOGLOBIN g/dL 9.8* 9.3* 9.2*   HEMATOCRIT % 29.4* 27.0* 26.7*   PLATELETS 10*3/mm3 313 282 230       Results from last 7 days  Lab Units 07/10/17  0443   SODIUM mmol/L 129*   POTASSIUM mmol/L 3.5   CHLORIDE mmol/L 102   CO2 mmol/L 21.0   BUN mg/dL <5*   CREATININE mg/dL 0.70   GLUCOSE mg/dL 94   CALCIUM mg/dL 8.5*       Results from last 7 days  Lab Units 07/10/17  0443   ALK PHOS U/L 72   BILIRUBIN mg/dL 0.2*   ALT (SGPT) U/L 20   AST (SGOT) U/L 35*       Results from last 7 days  Lab Units 07/09/17  0445   SED RATE mm/hr 57*                   Results from last 7 days  Lab Units 07/09/17  0445   LACTATE mmol/L 0.5     Estimated Creatinine Clearance: 81.6 mL/min (by C-G formula based on Cr of 0.7).      Microbiology:  Urine cx 7/7 E coli, blood cx neg.    Radiology:  Imaging Results (last 72 hours)     Procedure Component Value Units Date/Time    CT Abdomen Pelvis With Contrast [[id number]]  (Abnormal) Collected:  07/07/17 1941     Updated:  07/07/17 2134    Narrative:       EXAM:    CT Abdomen and Pelvis With Intravenous Contrast    CLINICAL HISTORY:    57 years, female; Pain; Abdominal pain; Other: See notes; Additional info:   Abd pain, fever    TECHNIQUE:    Axial computed tomography images of the abdomen and pelvis with intravenous   contrast.  This CT exam was performed using one or more of the following dose   reduction  techniques:  automated exposure control, adjustment of the mA and/or   kV according to patient size, and/or use of iterative reconstruction technique.    Coronal reformatted images were created and reviewed.    CONTRAST:    80 mL of isovue 300 administered intravenously.    COMPARISON:    No relevant prior studies available.    FINDINGS:    Lower thorax: No acute findings.     ABDOMEN:    Liver:  Unremarkable.  No obvious mass.    Gallbladder and bile ducts:  Cholelithiasis is noted. No obvious gallbladder   wall thickening or pericholecytistic inflammatory changes. No significant   biliary ductal dilatation appreciated.    Pancreas:  Unremarkable.  No ductal dilation.  No obvious mass.    Spleen:  Unremarkable.  No splenomegaly.    Adrenals:  Unremarkable.  No adrenal nodules or masses identified.    Kidneys and ureters:  There is mild heterogeneous enhancement of the kidneys,   best demonstrated on the delayed phase.  There is also mild perinephric   stranding which is slightly more prominent on the left.  Findings are   suspicious for pyelonephritis.  No hydronephrosis.  No renal or ureteral   stones.  There are small hypodense lesions at the upper pole of the left   kidney, one of which is compatible with a simple cyst.  The more lateral lesion   is slightly more dense than a simple cyst, and measures 1.5 cm.  There is an   additional lesion in the upper to midpole the left kidney laterally which   demonstrates coarse calcifications within it.  This measures 2.1 cm in   diameter.  Just inferior to this, within the mid to lower pole, there is an   additional hypodense lesion which likely represents a cyst.    Stomach and bowel:  No evidence of bowel obstruction.  No significant bowel   wall thickening appreciated.    Appendix: Normal appendix.     PELVIS:    Bladder:  Unremarkable.  No obvious mass.    Reproductive:  The uterus is atrophic.     ABDOMEN and PELVIS:    Intraperitoneal space:  Unremarkable.  No free  air.  No significant fluid   collection.    Bones/joints:  Degenerative changes of the spine.  There is lumbarization of   L5.  There is a compression fracture of L1 with severe associated vertebral   body height loss and retropulsion.  However, this is favored to be chronic.    Soft tissues:  No significant abnormalities in the superficial soft tissues.    Vasculature:  Atherosclerotic calcifications are noted. No aortic aneurysm.    Lymph nodes:  No significant lymph node enlargement.      Impression:       1.  Mild heterogeneous enhancement of the kidneys with associated perinephric   stranding which is more prominent on the left.  Findings are suspicious for   pyelonephritis.  Recommend clinical and laboratory correlation.  2.  Small left renal lesions, some of which are not compatible with simple   cysts.  One of these demonstrates coarse calcifications, compatible with a   Bosniak IIF cyst.    ACR White Paper guidelines ([place], et al. JACR 2010;   7(10):754-73) suggest CT or MRI follow-up in 6 months.  3.  L1 compression fracture with severe vertebral body height loss and   associated retropulsion.  This is favored to be chronic.      THIS DOCUMENT HAS BEEN ELECTRONICALLY SIGNED BY JOHNNY QUIROZ MD    XR Chest 1 View [[id number]]  (Abnormal) Collected:  07/07/17 2143     Updated:  07/07/17 2233    Narrative:       EXAM:    XR Chest, 1 View    CLINICAL HISTORY:    57 years, female; Disorder of kidney and ureter, unspecified; Fever,   unspecified; Signs and symptoms    TECHNIQUE:    Frontal view of the chest.    COMPARISON:    CT ABDOMEN PELVIS W CONTRAST 7/7/2017 8:38:45 PM    FINDINGS:    Lungs: Nipple shadow visualized projecting over the right lower chest. The   lungs are otherwise clear.  No consolidation.    Pleural space:  Unremarkable.  No pneumothorax.    Heart:  Unremarkable.  No cardiomegaly.    Mediastinum:  Unremarkable.    Bones/joints:  No acute osseous findings.    Vasculature:  Atherosclerotic  calcifications are visualized within the aorta.      Impression:         No acute findings visualized in the chest.     THIS DOCUMENT HAS BEEN ELECTRONICALLY SIGNED BY JOHNNY QUIROZ MD          I personally reviewed the radiographic studies     Assessment/Plan   IMPRESSION:     1. Sepsis, present on admission, possibly related to left pyelonephritis versus gastroenteritis. Urine is growing gram-negative rods. Likely Enterobacteriaceae. Less likely gastroenteritis with normal virus, salmonella, Shigella, Campylobacter, versus other.   2. Left pyelonephritis E coli.  3. Diarrhea, possible gastroenteritis versus C. difficile versus other.  Resolving.  4. Leukocytosis, neutrophilic related to causes listed above.  Resolved.  5. Anemia, chronic disease and related to acute infection.  6. Hyponatremia 129, continues.  7. Hypokalemia 3.3, resolved.  8. Hypocalcemia 8.5.     Plan:     1. Diagnostically, continue to follow patient's physical exam, CBC, CMP, CRP, pro-calcitonin level, blood cultures ×2, urine culture.   2. Therapeutically, change to Rocephin 2 g iv daily. Duration of therapy depends on her blood cultures and clinical response, but likely till 7/15/17, and may change to po levoflox for discharge.  Check an EKG for her QTc interval.  3. Supportive care with IV fluids. Free water restriction.    Increased risk for ADR, recurrent infxn.  Discussed signs and symptoms of infection and side effects of medications with the patient.  Discussed with medical team.    Pk Kong MD  7/10/2017

## 2017-07-10 NOTE — DISCHARGE SUMMARY
[facility]  DISCHARGE SUMMARY       Date of Admission: 7/7/2017  Date of Discharge:  7/10/2017  Primary Care Physician: No Known Provider  Consulting Physician(s)     Provider Relationship    Pk Kong MD Consulting Physician        Discharge Diagnoses:  Active Hospital Problems (** Indicates Principal Problem)    Diagnosis Date Noted   • **Pyelonephritis [N12] 07/10/2017   • Hyperlipidemia [E78.5] 07/10/2017   • Essential hypertension [I10] 07/10/2017   • Lupus [M32.9] 07/10/2017   • Acute febrile illness [R50.9] 07/07/2017      Resolved Hospital Problems    Diagnosis Date Noted Date Resolved   No resolved problems to display.     Presenting Problem/History of Present Illness  Acute febrile illness [R50.9]     Chief Complaint on Day of Discharge:   Patient is laying in bed, and states that she is ready to be discharged today, with no new complaints and in no acute distress.  She reports that she has been afebrile for the past 48 hours, and that her diarrhea is forming and is no longer liquid.  She states that she has frequently been up to the bathroom overnight to urinate, but denies dysuria or flank pain. Per ID, will discharge patient home today on PO Levaquin if QTC is within normal limits.     Hospital Course  Ms Bejarano is A 57-year-old female with past medical history significant for COPD, hypertension, hyperlipidemia, and lupus who presented to [facility] ED on 07/07/2017 with diarrhea.  Upon admission, patient stated that 4 days ago she started feeling nauseous and developed chills.  3 days ago, she developed vomiting plus diarrhea.  The past 24 hours prior to admission, she had 5-10 episodes of diarrhea with fever.  CT of the abdomen was performed, which showed suspicion for pyelonephritis and also left renal lesions; the patient denied flank pain.  In the ED she was also febrile with temperature to 102, tachycardic, leukocytosis with WBC 13.2, hypokalemia,  anemia, and hyponatremic with sodium of 122, and but no mental status changes.  UA revealed large leukocyte esterase and urine culture was positive for gram-negative bacilli.  She was cultured in the ED and placed on vancomycin and Zosyn; ID was consulted.  ID started the patient on Rocephin, but per their recommendation we will send her home on PO Levaquin for 5 days as her QTc is within normal limits (EKG performed 7/10/2017).  While in the hospital she had liquid diarrhea, which improved and lessened in frequency.  Her C. difficile was negative.  Her blood cultures are negative growth to date, and the patient has remained afebrile for the past 48 hours, with no new complaints. Today her Na is 129, much improved from baseline and her H/H is stable, with hgb at 9.8. The patient is originally from [place], visiting her son here in [place] for the birth of her granddaughter.  She will follow-up with urgent care here as needed, as she is not from [place], and in [place] when she goes home.  Patient will have a prescription for Levaquin 750 daily through 7/15/17.  Patient is ready for discharge.    Procedures Performed  none     Consults:   Consults     Date and Time Order Name Status Description    7/7/2017 2345 Inpatient Consult to Infectious Diseases Completed         Pertinent Test Results:  ECG/EMG Results (last 24 hours)     Procedure Component Value Units Date/Time    ECG 12 Lead [[id number]] Collected:  07/10/17 1225    Test Reason : qt interval  Blood Pressure : **/** mmHG  Vent. Rate : 082 BPM     Atrial Rate : 082 BPM     P-R Int : 158 ms          QRS Dur : 092 ms      QT Int : 374 ms       P-R-T Axes : 067 023 057 degrees     QTc Int : 436 ms    Normal sinus rhythm  Cannot rule out Anterior infarct , age undetermined  Abnormal ECG  No previous ECGs available    Referred By:             Confirmed By:       QT/QTc within normal limits    Condition on Discharge:  stable    Physical Exam on Discharge:BP  "164/94 (BP Location: Right arm, Patient Position: Lying)  Pulse 99  Temp 98.6 °F (37 °C) (Oral)   Resp 18  Ht 62\" (157.5 cm)  Wt 125 lb 6.4 oz (56.9 kg)  SpO2 92%  BMI 22.94 kg/m2  Physical Exam   General: Well-developed well-nourished female in no acute distress    Head: Normocephalic atraumatic    Eyes: PERRLA, EOMI, nonicteric, conjunctiva normal    ENT: Pink, moist mucous membranes    Neck: Supple, nontender, trachea midline without lymphadenopathy, JVD, nuchal rigidity.      Cardiovascular: RRR  no M/R/G, 2+ DP pulses bilaterally.    Respiratory: Nonlabored, symmetrical chest expansion, clear to auscultation bilaterally    Abdomen: Soft, nontender, nondistended,  positive bowel sounds in all 4 quadrants     Extremities: FROM in upper and lower extremities bilaterally negative for edema/cyanosis/clubbing.  Negative calf pain    Skin: Pink/warm/dry.  No rash or lesions noted    Neuro: Alert and oriented to person place time and situation, speech is clear, follows all commands, recent and remote memory intact    Psych: Patient is pleasant and cooperative.  Normal affect.  Negative suicidal ideation or homicidal ideation.    Discharge Disposition  Home or Self Care    Discharge Medications   Venessa Bejarano   Home Medication Instructions ALEXANDRE:[id number]    Printed on:07/10/17 1423   Medication Information                      albuterol (PROVENTIL HFA;VENTOLIN HFA) 108 (90 BASE) MCG/ACT inhaler  Inhale 2 puffs Every 4 (Four) Hours As Needed for Wheezing.             budesonide-formoterol (SYMBICORT) 80-4.5 MCG/ACT inhaler  Inhale 2 puffs 2 (Two) Times a Day.             dicyclomine (BENTYL) 10 MG capsule  Take 10 mg by mouth 4 (Four) Times a Day Before Meals & at Bedtime.             FLUoxetine (PROzac) 20 MG capsule  Take 20 mg by mouth Daily.             folic acid (FOLVITE) 1 MG tablet  Take 1 mg by mouth Daily.             levoFLOXacin (LEVAQUIN) 750 MG tablet  Take 1 tablet by mouth Daily for 5 " days.             meloxicam (MOBIC) 15 MG tablet  Take 15 mg by mouth Daily.             metoprolol tartrate (LOPRESSOR) 50 MG tablet  Take 50 mg by mouth 2 (Two) Times a Day.             pravastatin (PRAVACHOL) 20 MG tablet  Take 20 mg by mouth Daily.               Discharge Diet:   Diet Instructions     Diet: Regular; Thin Liquids, No Restrictions       Discharge Diet:  Regular   Fluid Consistency:  Thin Liquids, No Restrictions               Discharge Care Plan / Instructions:  Activity at Discharge:   Activity Instructions     Activity as Tolerated                   Follow-up Appointments  No future appointments.  Additional Instructions for the Follow-ups that You Need to Schedule     Discharge Follow-Up With Specified Provider    As directed    To:  Follow up with PCP in [facility] as needed   Has the patient’s follow-up appointment been scheduled and documented in the discharge navigator?:  Patient to schedule, documented in the follow-up section           Additional information on Labs and Follow-ups:      Please follow up with your PCP as soon as you can                Test Results Pending at Discharge Order Current Status    Blood Culture Preliminary result    Blood Culture Preliminary result        LUIS Patel 07/10/17 2:23 PM    Time: Discharge 60 min    Please note that portions of this note may have been completed with a voice recognition program. Efforts were made to edit the dictations, but occasionally words are mistranscribed.

## 2017-07-10 NOTE — PROGRESS NOTES
"Hospitalist Daily Progress Note    Date of Admission: 7/7/2017   LOS: 3 days   PCP: No Known Provider    Chief Complaint: fevers  Subjective    Patient sitting up in bed in NAD.  Patient states she has been fever free for almost 48 hours and that her bowel movements are soft, no longer liquid. She states she did not sleep very much last night due to frequent urination, but denies dysuria. Patient states that she has no new complaints and is ready to be discharged.     Abdominal Pain       Review of Systems   Gastrointestinal: Positive for abdominal pain.   General: no fevers, chills  Respiratory: no cough, dyspnea  Cardiovascular: no chest pain, palpitations  Abdomen: Soft stools.  No abdominal pain, nausea, vomiting  Neurologic: No focal weakness    Objective   Physical Exam:  I have reviewed the vital signs.  Objective:  Vital signs: (most recent): Blood pressure 164/94, pulse 99, temperature 98.6 °F (37 °C), temperature source Oral, resp. rate 18, height 62\" (157.5 cm), weight 125 lb 6.4 oz (56.9 kg), SpO2 92 %.          General: Alert, well-developed well-nourished female in no acute distress    Head: Normocephalic atraumatic    Eyes: PERRLA, EOMI, nonicteric, conjunctiva normal    ENT: Pink, moist mucous membranes    Neck: Supple, nontender, trachea midline without lymphadenopathy, JVD, nuchal rigidity.      Cardiovascular: RRR  no M/R/G + 2 DP pulses bilaterally    Respiratory: Nonlabored, symmetrical chest expansion, clear to auscultation bilaterally    Abdomen: Obese, soft, nontender, nondistended,  hyperactiveounds in all 4 quadrants     Extremities: FROM in upper and lower extremities bilaterally.   negative for edema/cyanosis/clubbing.  Negative calf pain    Skin: Pink/warm/dry.  No rash or lesions noted    Neuro: Alert and oriented to person place time and situation, speech is clear, follows all commands, recent and remote memory intact    Psych: Patient is pleasant and cooperative.  Normal affect.  " Negative suicidal ideation or homicidal ideation.    Results Review:    I have reviewed the labs, radiology results and diagnostic studies.    Results from last 7 days  Lab Units 07/10/17  0443   WBC 10*3/mm3 9.06   HEMOGLOBIN g/dL 9.8*   PLATELETS 10*3/mm3 313       Results from last 7 days  Lab Units 07/10/17  0443   SODIUM mmol/L 129*   POTASSIUM mmol/L 3.5   CO2 mmol/L 21.0   CREATININE mg/dL 0.70   GLUCOSE mg/dL 94     Microbiology Results (last 10 days)     Procedure Component Value - Date/Time    Clostridium Difficile Toxin, PCR [[id number]]  (Normal) Collected:  07/09/17 1603    Lab Status:  Final result Specimen:  Stool from Per Rectum Updated:  07/09/17 1656     C. Difficile Toxins by PCR Not Detected    Narrative:         Performance characteristics of test not established for patients <2 years of age.  Negative for Toxigenic C. Difficile    Stool Culture [[id number]] Collected:  07/08/17 1418    Lab Status:  Final result Specimen:  Stool from Per Rectum Updated:  07/10/17 0842     Stool Culture No Salmonella, Shigella, Campylobacter, E. coli O157, or Vibrio isolated at 48 hours    Narrative:         Not cultured for Yersinia.    Blood Culture [[id number]]  (Normal) Collected:  07/07/17 1935    Lab Status:  Preliminary result Specimen:  Blood from Arm, Left Updated:  07/09/17 2001     Blood Culture No growth at 2 days    Blood Culture [[id number]]  (Normal) Collected:  07/07/17 1907    Lab Status:  Preliminary result Specimen:  Blood from Arm, Right Updated:  07/09/17 2001     Blood Culture No growth at 2 days    Urine Culture [[id number]]  (Abnormal)  (Susceptibility) Collected:  07/07/17 1730    Lab Status:  Final result Specimen:  Urine from Urine, Clean Catch Updated:  07/09/17 1304     Urine Culture >100,000 CFU/mL Escherichia coli (A)    Susceptibility      Escherichia coli     DELMAR     Ampicillin <=8 ug/ml Susceptible     Ampicillin + Sulbactam <=8/4 ug/ml Susceptible     Aztreonam <=8 ug/ml Susceptible      Cefepime <=8 ug/ml Susceptible     Cefotaxime <=2 ug/ml Susceptible     Ceftriaxone <=8 ug/ml Susceptible     Cefuroxime sodium <=4 ug/ml Susceptible     Cephalothin <=8 ug/ml Susceptible     Ertapenem <=1 ug/ml Susceptible     Gentamicin <=4 ug/ml Susceptible     Levofloxacin <=2 ug/ml Susceptible     Meropenem <=1 ug/ml Susceptible     Nitrofurantoin <=32 ug/ml Susceptible     Piperacillin + Tazobactam <=16 ug/ml Susceptible     Tetracycline <=4 ug/ml Susceptible     Tobramycin <=4 ug/ml Susceptible     Trimethoprim + Sulfamethoxazole <=2/38 ug/ml Susceptible                        Imaging Results (last 24 hours)     ** No results found for the last 24 hours. **        Blood Culture   Date Value Ref Range Status   07/07/2017 No growth at 2 days  Preliminary     Stool Culture   Date Value Ref Range Status   07/08/2017   Final    No Salmonella, Shigella, Campylobacter, E. coli O157, or Vibrio isolated at 48 hours     Urine Culture   Date Value Ref Range Status   07/07/2017 >100,000 CFU/mL Escherichia coli (A)  Final          I have reviewed the medications  ---------------------------------------------------------------------------------------------  Assessment/Plan   Assessment & Plan  Assessment/Problem List  Active Problems:    Acute febrile illness    57 year old female with febrile illness and sepsis POA    Plan  Sepsis (Present on Admission)  -- broad spectrum IV abx  -- IV normal saline   -- procalcitonin 74-->46-->26  -- diarrhea improving, blood cultures NGTD  Fevers on Abx  -- Resolved, no fever in >24 hrs  -- IV abx, hopefully change to PO for discharge per ID  Probable Pyelonephritis  -- IV abx, change to PO per ID  Hypokalemia  -- supplement potassium  Hyponatremia  -- Up today to 129 from 122   -- Improving, free water restriction  Chronic Compression Fracture  -- incidentally found on CT    DVT prophylaxis: Lovenox 40 mg SC  CODE STATUS: Full code   Discharge Planning: I expect patient to be  discharged today    Ita Oneill, LUIS 07/10/17 11:06 AM

## 2017-07-12 LAB
BACTERIA SPEC AEROBE CULT: NORMAL
BACTERIA SPEC AEROBE CULT: NORMAL